# Patient Record
Sex: FEMALE | Race: WHITE | NOT HISPANIC OR LATINO | Employment: OTHER | URBAN - METROPOLITAN AREA
[De-identification: names, ages, dates, MRNs, and addresses within clinical notes are randomized per-mention and may not be internally consistent; named-entity substitution may affect disease eponyms.]

---

## 2023-11-15 ENCOUNTER — APPOINTMENT (OUTPATIENT)
Dept: PRIMARY CARE | Facility: CLINIC | Age: 73
End: 2023-11-15
Payer: COMMERCIAL

## 2023-11-26 PROBLEM — T21.21XA SECOND DEGREE BURN OF CHEST WALL: Status: ACTIVE | Noted: 2023-11-26

## 2023-11-26 PROBLEM — S92.309A METATARSAL FRACTURE: Status: ACTIVE | Noted: 2023-11-26

## 2023-11-26 PROBLEM — R07.89 INTERMITTENT LEFT-SIDED CHEST PAIN: Status: ACTIVE | Noted: 2023-11-26

## 2023-11-26 PROBLEM — B36.9 FUNGAL RASH OF TRUNK: Status: ACTIVE | Noted: 2023-11-26

## 2023-11-26 PROBLEM — D64.9 ANEMIA: Status: ACTIVE | Noted: 2023-11-26

## 2023-11-26 PROBLEM — S06.5XAA SDH (SUBDURAL HEMATOMA) (MULTI): Status: ACTIVE | Noted: 2023-11-26

## 2023-11-26 PROBLEM — A49.02 METHICILLIN RESISTANT STAPHYLOCOCCUS AUREUS INFECTION: Status: ACTIVE | Noted: 2023-11-26

## 2023-11-26 PROBLEM — S01.00XA OPEN WOUND OF SCALP: Status: ACTIVE | Noted: 2023-11-26

## 2023-11-26 PROBLEM — N18.2 CHRONIC KIDNEY DISEASE, STAGE 2 (MILD): Status: ACTIVE | Noted: 2023-11-26

## 2023-11-26 PROBLEM — K59.00 CONSTIPATION: Status: ACTIVE | Noted: 2023-11-26

## 2023-11-26 PROBLEM — G47.00 INSOMNIA: Status: ACTIVE | Noted: 2023-11-26

## 2023-11-26 PROBLEM — J45.909 ASTHMA (HHS-HCC): Status: ACTIVE | Noted: 2023-11-26

## 2023-11-26 PROBLEM — E83.52 HYPERCALCEMIA: Status: ACTIVE | Noted: 2023-11-26
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
## 2023-11-26 PROBLEM — R30.0 BURNING WITH URINATION: Status: ACTIVE | Noted: 2023-11-26

## 2023-11-26 PROBLEM — R39.11 URINARY HESITANCY: Status: ACTIVE | Noted: 2023-11-26

## 2023-11-26 PROBLEM — G06.2 SUBDURAL EMPYEMA (HHS-HCC): Status: ACTIVE | Noted: 2023-11-26

## 2023-11-26 PROBLEM — T81.30XA WOUND DEHISCENCE: Status: ACTIVE | Noted: 2023-11-26

## 2023-11-26 PROBLEM — G43.909 MIGRAINES: Status: ACTIVE | Noted: 2023-11-26

## 2023-11-26 PROBLEM — D75.1 ERYTHROCYTOSIS: Status: ACTIVE | Noted: 2023-11-26

## 2023-11-26 PROBLEM — N90.89 LESION OF VULVA: Status: ACTIVE | Noted: 2023-11-26

## 2023-11-26 PROBLEM — F17.210 CIGARETTE NICOTINE DEPENDENCE WITHOUT COMPLICATION: Status: ACTIVE | Noted: 2023-11-26

## 2023-11-26 PROBLEM — M81.0 OSTEOPOROSIS: Status: ACTIVE | Noted: 2023-11-26

## 2023-11-26 PROBLEM — M19.90 ARTHRITIS: Status: ACTIVE | Noted: 2023-11-26

## 2023-11-26 PROBLEM — M79.676 TOE PAIN: Status: ACTIVE | Noted: 2023-11-26

## 2023-11-26 PROBLEM — N89.8 VAGINAL IRRITATION: Status: ACTIVE | Noted: 2023-11-26
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
## 2023-11-26 PROBLEM — J06.9 VIRAL UPPER RESPIRATORY INFECTION: Status: ACTIVE | Noted: 2023-11-26

## 2023-11-26 PROBLEM — M54.2 NECK PAIN: Status: ACTIVE | Noted: 2023-11-26

## 2023-11-26 PROBLEM — E55.9 VITAMIN D DEFICIENCY: Status: ACTIVE | Noted: 2023-11-26

## 2023-11-26 PROBLEM — E87.6 HYPOKALEMIA: Status: ACTIVE | Noted: 2023-11-26

## 2023-11-26 PROBLEM — R50.9 FEVER: Status: ACTIVE | Noted: 2023-11-26

## 2023-11-26 PROBLEM — B37.31 VAGINAL YEAST INFECTION: Status: ACTIVE | Noted: 2023-11-26

## 2023-11-26 PROBLEM — S92.355A CLOSED NONDISPLACED FRACTURE OF FIFTH LEFT METATARSAL BONE: Status: ACTIVE | Noted: 2023-11-26

## 2023-11-26 PROBLEM — J18.9 PNEUMONIA: Status: ACTIVE | Noted: 2023-11-26

## 2023-11-26 PROBLEM — E78.00 HYPERCHOLESTEROLEMIA: Status: ACTIVE | Noted: 2023-11-26

## 2023-11-26 PROBLEM — I10 BENIGN ESSENTIAL HYPERTENSION: Status: ACTIVE | Noted: 2023-11-26

## 2023-11-26 RX ORDER — ALCLOMETASONE DIPROPIONATE 0.5 MG/G
OINTMENT TOPICAL 2 TIMES DAILY
COMMUNITY
Start: 2017-10-13

## 2023-11-26 RX ORDER — GABAPENTIN 400 MG/1
1 CAPSULE ORAL 4 TIMES DAILY
COMMUNITY
Start: 2018-01-12

## 2023-11-26 RX ORDER — DOXYCYCLINE 100 MG/1
100 CAPSULE ORAL EVERY 12 HOURS
COMMUNITY
Start: 2021-04-23 | End: 2024-01-08 | Stop reason: WASHOUT

## 2023-11-30 ENCOUNTER — TELEMEDICINE (OUTPATIENT)
Dept: INFECTIOUS DISEASES | Facility: CLINIC | Age: 73
End: 2023-11-30
Payer: COMMERCIAL

## 2023-11-30 DIAGNOSIS — M86.38 CHRONIC MULTIFOCAL OSTEOMYELITIS, OTHER SITE (MULTI): Primary | ICD-10-CM

## 2023-11-30 DIAGNOSIS — A49.02 METHICILLIN RESISTANT STAPHYLOCOCCUS AUREUS INFECTION: ICD-10-CM

## 2023-11-30 PROCEDURE — 99214 OFFICE O/P EST MOD 30 MIN: CPT | Performed by: INTERNAL MEDICINE

## 2023-11-30 RX ORDER — SULFAMETHOXAZOLE AND TRIMETHOPRIM 800; 160 MG/1; MG/1
1 TABLET ORAL DAILY
Qty: 90 TABLET | Refills: 1 | Status: SHIPPED | OUTPATIENT
Start: 2023-11-30 | End: 2023-11-30 | Stop reason: ENTERED-IN-ERROR

## 2023-11-30 ASSESSMENT — ENCOUNTER SYMPTOMS
ALLERGIC/IMMUNOLOGIC NEGATIVE: 1
ENDOCRINE NEGATIVE: 1
MUSCULOSKELETAL NEGATIVE: 1
CARDIOVASCULAR NEGATIVE: 1
NEUROLOGICAL NEGATIVE: 1
GASTROINTESTINAL NEGATIVE: 1
RESPIRATORY NEGATIVE: 1
HEMATOLOGIC/LYMPHATIC NEGATIVE: 1
CONSTITUTIONAL NEGATIVE: 1
EYES NEGATIVE: 1
PSYCHIATRIC NEGATIVE: 1

## 2023-11-30 NOTE — PROGRESS NOTES
Infectious Diseases Clinic Follow-up:    Reason for Visit: No chief complaint on file.      History of Current Issue  Emy Potts is a 73 y.o. year old female       Here for routine follow up. Known to me from 2019 for cranial infection with hardware. Recently admitted in 9/2021 for suspicion of surgical site infection due to exposed wire, full details as below.     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     # cranial hardware infection  Unclear if infection lead to wound dehiscence or vice versa.  Hardware growing coag negative staph (methicillin sensitive) and therefore  concern for surrounding associated osteomyelitis and will need long course of  antibiotics. In addition new hardware placed.  Continue        Recommendations:  - Start cefazolin 2 grams IV every 8 hours; plan on stop date of 11/05/202  - Restart Doxycycline 100 mg PO twice a day for chronic suppression (hx of MRSA  infection, not expected to be covered by cefazolin)  - stop cefepime, vancomycin .  - While on antibiotics, collect the following labs weekly: CBC with  differential, BMP. Fax all results to [phone] (att Dr Maciel)  - Please include in discharge information the need to monitor patient for  fevers, chills, excessive night sweats, rash, vomiting, diarrhea, worsening  pain and other signs of active infection or antibiotic side effects.  - Patient has a follow up appointment with Dr Jesus Manuel Maciel in Infectious  Diseases clinics on 10/28/2021 at 10 AM. Call [phone] for questions after  discharge.     Discussed with primary team.     ID will sign off.  Please call us with questions.  ID team B pager [phone].  For new consults, contact pager [phone]     PAST MEDICAL  HISTORY:  Past Medical History:   Diagnosis Date    Personal history of other diseases of the circulatory system 01/14/2014    History of hypertension    Personal history of other diseases of the circulatory system 07/01/2020    History of subdural hematoma    Personal history of other diseases of the musculoskeletal system and connective tissue 06/26/2018    History of low back pain    Personal history of other infectious and parasitic diseases 07/01/2020    History of staphylococcal infection    Personal history of other specified conditions 06/26/2018    History of insomnia    Personal history of pneumonia (recurrent) 07/17/2018    History of pneumonia    Unspecified asthma, uncomplicated 03/19/2017    Asthma       PAST SURGICAL HISTORY:  Past Surgical History:   Procedure Laterality Date    BACK SURGERY  01/14/2014    Back Surgery    CT HEAD ANGIO W AND WO IV CONTRAST  8/18/2019    CT HEAD ANGIO W AND WO IV CONTRAST 8/18/2019 [facility]GAC[facility]    MR HEAD ANGIO WO IV CONTRAST  8/2/2019    MR HEAD ANGIO WO IV CONTRAST 8/2/2019 [facility]ICAL [facility]    MR NECK ANGIO WO IV CONTRAST  8/2/2019    MR NECK ANGIO WO IV CONTRAST 8/2/2019 [facility]ICAL [facility]    NECK SURGERY  01/14/2014    Neck Surgery    OTHER SURGICAL HISTORY  01/14/2014    General Surgery    OTHER SURGICAL HISTORY  07/01/2020    Brain surgery    OTHER SURGICAL HISTORY  07/15/2022    Ectopic pregnancy removal with salpingectomy       ALLERGIES:    No Known Allergies    MEDICATIONS:      Current Outpatient Medications:     alclometasone (Aclovate) 0.05 % ointment, Apply topically 2 times a day., Disp: , Rfl:     doxycycline (Vibramycin) 100 mg capsule, 1 capsule (100 mg) every 12 hours., Disp: , Rfl:     gabapentin (Neurontin) 400 mg capsule, Take 1 capsule (400 mg) by mouth 4 times a day., Disp: , Rfl:     REVIEW OF SYSTEMS:  Review of Systems   Constitutional: Negative.    HENT: Negative.     Eyes: Negative.    Respiratory: Negative.      Cardiovascular: Negative.    Gastrointestinal: Negative.    Endocrine: Negative.    Genitourinary: Negative.    Musculoskeletal: Negative.    Skin: Negative.    Allergic/Immunologic: Negative.    Neurological: Negative.    Hematological: Negative.    Psychiatric/Behavioral: Negative.       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     IMPRESSIONS:  # Cranial hardware infection  Unclear if infection lead to wound dehiscence or vice versa.  Hardware growing coag negative staph (methicillin sensitive) and therefore  concern for surrounding associated osteomyelitis and will need long course of  antibiotics. In addition new hardware placed.  Continue     CURRENT:  Doing well.  No issues with doxy     PLAN:  Doxycycline 100 mg PO q12 for chronic suppression  RTC in 4-6 mo     Pt is in agreement with plan. States that all their questions were answered.       I spent 20 minutes in the professional and overall care of this patient.      Jesus Manuel Maciel MD MPH

## 2023-12-06 ENCOUNTER — APPOINTMENT (OUTPATIENT)
Dept: PRIMARY CARE | Facility: CLINIC | Age: 73
End: 2023-12-06
Payer: COMMERCIAL

## 2024-01-08 DIAGNOSIS — I10 BENIGN ESSENTIAL HYPERTENSION: Primary | ICD-10-CM

## 2024-01-08 PROBLEM — J06.9 VIRAL UPPER RESPIRATORY INFECTION: Status: RESOLVED | Noted: 2023-11-26 | Resolved: 2024-01-08

## 2024-01-08 PROBLEM — M75.102 ROTATOR CUFF TEAR ARTHROPATHY, LEFT: Status: ACTIVE | Noted: 2020-08-07

## 2024-01-08 PROBLEM — G89.11 ACUTE PAIN DUE TO TRAUMA: Status: RESOLVED | Noted: 2024-01-08 | Resolved: 2024-01-08

## 2024-01-08 PROBLEM — M54.2 NECK PAIN: Status: RESOLVED | Noted: 2023-11-26 | Resolved: 2024-01-08

## 2024-01-08 PROBLEM — M25.512 PAIN IN LEFT SHOULDER: Status: RESOLVED | Noted: 2020-08-20 | Resolved: 2024-01-08

## 2024-01-08 PROBLEM — B36.9 FUNGAL RASH OF TRUNK: Status: RESOLVED | Noted: 2023-11-26 | Resolved: 2024-01-08

## 2024-01-08 PROBLEM — R30.0 BURNING WITH URINATION: Status: RESOLVED | Noted: 2023-11-26 | Resolved: 2024-01-08

## 2024-01-08 PROBLEM — K21.9 GERD (GASTROESOPHAGEAL REFLUX DISEASE): Status: ACTIVE | Noted: 2020-08-07

## 2024-01-08 PROBLEM — M19.012 PRIMARY OSTEOARTHRITIS OF LEFT SHOULDER: Status: ACTIVE | Noted: 2020-08-07
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
## 2024-01-08 PROBLEM — J18.9 PNEUMONIA: Status: RESOLVED | Noted: 2023-11-26 | Resolved: 2024-01-08

## 2024-01-08 PROBLEM — R07.89 INTERMITTENT LEFT-SIDED CHEST PAIN: Status: RESOLVED | Noted: 2023-11-26 | Resolved: 2024-01-08

## 2024-01-08 PROBLEM — S46.102A INJURY OF TENDON OF LONG HEAD OF BICEPS, LEFT, INITIAL ENCOUNTER: Status: RESOLVED | Noted: 2020-08-07 | Resolved: 2024-01-08

## 2024-01-08 PROBLEM — M79.676 TOE PAIN: Status: RESOLVED | Noted: 2023-11-26 | Resolved: 2024-01-08

## 2024-01-08 PROBLEM — Z86.79 HISTORY OF SUBDURAL HEMATOMA: Status: RESOLVED | Noted: 2020-08-07 | Resolved: 2024-01-08

## 2024-01-08 PROBLEM — N90.89 LESION OF VULVA: Status: RESOLVED | Noted: 2023-11-26 | Resolved: 2024-01-08

## 2024-01-08 PROBLEM — R50.9 FEVER: Status: RESOLVED | Noted: 2023-11-26 | Resolved: 2024-01-08

## 2024-01-08 PROBLEM — T81.30XA WOUND DEHISCENCE: Status: RESOLVED | Noted: 2023-11-26 | Resolved: 2024-01-08

## 2024-01-08 PROBLEM — M25.572 ACUTE LEFT ANKLE PAIN: Status: RESOLVED | Noted: 2024-01-08 | Resolved: 2024-01-08
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
## 2024-01-08 PROBLEM — R39.11 URINARY HESITANCY: Status: RESOLVED | Noted: 2023-11-26 | Resolved: 2024-01-08

## 2024-01-08 PROBLEM — N89.8 VAGINAL IRRITATION: Status: RESOLVED | Noted: 2023-11-26 | Resolved: 2024-01-08

## 2024-01-08 PROBLEM — B37.31 VAGINAL YEAST INFECTION: Status: RESOLVED | Noted: 2023-11-26 | Resolved: 2024-01-08

## 2024-01-08 PROBLEM — S92.355A CLOSED NONDISPLACED FRACTURE OF FIFTH LEFT METATARSAL BONE: Status: RESOLVED | Noted: 2023-11-26 | Resolved: 2024-01-08

## 2024-01-08 PROBLEM — T21.21XA SECOND DEGREE BURN OF CHEST WALL: Status: RESOLVED | Noted: 2023-11-26 | Resolved: 2024-01-08

## 2024-01-08 PROBLEM — S92.309A METATARSAL FRACTURE: Status: RESOLVED | Noted: 2023-11-26 | Resolved: 2024-01-08

## 2024-01-08 PROBLEM — S01.00XA OPEN WOUND OF SCALP: Status: RESOLVED | Noted: 2023-11-26 | Resolved: 2024-01-08

## 2024-01-08 PROBLEM — K59.00 CONSTIPATION: Status: RESOLVED | Noted: 2023-11-26 | Resolved: 2024-01-08

## 2024-01-08 PROBLEM — M24.512 CONTRACTURE OF LEFT SHOULDER: Status: RESOLVED | Noted: 2020-08-07 | Resolved: 2024-01-08

## 2024-01-08 PROBLEM — M12.812 ROTATOR CUFF TEAR ARTHROPATHY, LEFT: Status: ACTIVE | Noted: 2020-08-07

## 2024-01-08 RX ORDER — ALBUTEROL SULFATE 90 UG/1
2 AEROSOL, METERED RESPIRATORY (INHALATION) EVERY 4 HOURS PRN
COMMUNITY
Start: 2013-09-19 | End: 2024-02-05 | Stop reason: SDUPTHER

## 2024-01-08 RX ORDER — OLMESARTAN MEDOXOMIL 20 MG/1
1 TABLET ORAL DAILY
COMMUNITY
Start: 2020-08-05 | End: 2024-01-08 | Stop reason: SDUPTHER

## 2024-01-08 RX ORDER — NYSTATIN 100000 U/G
CREAM TOPICAL AS NEEDED
COMMUNITY
Start: 2019-10-17

## 2024-01-08 RX ORDER — CHOLECALCIFEROL (VITAMIN D3) 25 MCG
1 TABLET ORAL DAILY
COMMUNITY

## 2024-01-08 RX ORDER — TIZANIDINE 2 MG/1
TABLET ORAL 4 TIMES DAILY PRN
COMMUNITY

## 2024-01-08 RX ORDER — HYDROCHLOROTHIAZIDE 25 MG/1
25 TABLET ORAL
COMMUNITY
Start: 2020-07-01 | End: 2024-02-05 | Stop reason: WASHOUT

## 2024-01-08 RX ORDER — TRAMADOL HYDROCHLORIDE 50 MG/1
1 TABLET ORAL EVERY 4 HOURS
COMMUNITY
Start: 2014-01-14

## 2024-01-08 RX ORDER — OLMESARTAN MEDOXOMIL 20 MG/1
20 TABLET ORAL DAILY
Qty: 90 TABLET | Refills: 1 | Status: SHIPPED | OUTPATIENT
Start: 2024-01-08 | End: 2024-02-05 | Stop reason: SDUPTHER

## 2024-02-01 ENCOUNTER — LAB (OUTPATIENT)
Dept: LAB | Facility: LAB | Age: 74
End: 2024-02-01
Payer: COMMERCIAL

## 2024-02-01 DIAGNOSIS — Z00.00 ENCOUNTER FOR GENERAL ADULT MEDICAL EXAMINATION WITHOUT ABNORMAL FINDINGS: Primary | ICD-10-CM

## 2024-02-01 DIAGNOSIS — D64.9 ANEMIA, UNSPECIFIED: ICD-10-CM

## 2024-02-01 DIAGNOSIS — I10 ESSENTIAL (PRIMARY) HYPERTENSION: ICD-10-CM

## 2024-02-01 DIAGNOSIS — E78.00 PURE HYPERCHOLESTEROLEMIA, UNSPECIFIED: ICD-10-CM

## 2024-02-01 DIAGNOSIS — M81.0 AGE-RELATED OSTEOPOROSIS WITHOUT CURRENT PATHOLOGICAL FRACTURE: ICD-10-CM

## 2024-02-01 DIAGNOSIS — E83.52 HYPERCALCEMIA: ICD-10-CM

## 2024-02-01 LAB
25(OH)D3 SERPL-MCNC: 65 NG/ML (ref 30–100)
ALBUMIN SERPL BCP-MCNC: 4.2 G/DL (ref 3.4–5)
ALP SERPL-CCNC: 37 U/L (ref 33–136)
ALT SERPL W P-5'-P-CCNC: 14 U/L (ref 7–45)
ANION GAP SERPL CALC-SCNC: 15 MMOL/L (ref 10–20)
AST SERPL W P-5'-P-CCNC: 15 U/L (ref 9–39)
BASOPHILS # BLD AUTO: 0.08 X10*3/UL (ref 0–0.1)
BASOPHILS NFR BLD AUTO: 1.1 %
BILIRUB SERPL-MCNC: 0.6 MG/DL (ref 0–1.2)
BUN SERPL-MCNC: 33 MG/DL (ref 6–23)
CALCIUM SERPL-MCNC: 11.2 MG/DL (ref 8.6–10.6)
CHLORIDE SERPL-SCNC: 98 MMOL/L (ref 98–107)
CHOLEST SERPL-MCNC: 276 MG/DL (ref 0–199)
CHOLESTEROL/HDL RATIO: 3.6
CO2 SERPL-SCNC: 33 MMOL/L (ref 21–32)
CREAT SERPL-MCNC: 0.75 MG/DL (ref 0.5–1.05)
EGFRCR SERPLBLD CKD-EPI 2021: 85 ML/MIN/1.73M*2
EOSINOPHIL # BLD AUTO: 0.06 X10*3/UL (ref 0–0.4)
EOSINOPHIL NFR BLD AUTO: 0.8 %
ERYTHROCYTE [DISTWIDTH] IN BLOOD BY AUTOMATED COUNT: 14.3 % (ref 11.5–14.5)
GLUCOSE SERPL-MCNC: 93 MG/DL (ref 74–99)
HCT VFR BLD AUTO: 40.4 % (ref 36–46)
HDLC SERPL-MCNC: 76.9 MG/DL
HGB BLD-MCNC: 13.4 G/DL (ref 12–16)
IMM GRANULOCYTES # BLD AUTO: 0.07 X10*3/UL (ref 0–0.5)
IMM GRANULOCYTES NFR BLD AUTO: 1 % (ref 0–0.9)
LDLC SERPL CALC-MCNC: 183 MG/DL
LYMPHOCYTES # BLD AUTO: 1.88 X10*3/UL (ref 0.8–3)
LYMPHOCYTES NFR BLD AUTO: 25.5 %
MCH RBC QN AUTO: 30.3 PG (ref 26–34)
MCHC RBC AUTO-ENTMCNC: 33.2 G/DL (ref 32–36)
MCV RBC AUTO: 91 FL (ref 80–100)
MONOCYTES # BLD AUTO: 0.63 X10*3/UL (ref 0.05–0.8)
MONOCYTES NFR BLD AUTO: 8.6 %
NEUTROPHILS # BLD AUTO: 4.64 X10*3/UL (ref 1.6–5.5)
NEUTROPHILS NFR BLD AUTO: 63 %
NON HDL CHOLESTEROL: 199 MG/DL (ref 0–149)
NRBC BLD-RTO: 0 /100 WBCS (ref 0–0)
PLATELET # BLD AUTO: 331 X10*3/UL (ref 150–450)
POTASSIUM SERPL-SCNC: 5.3 MMOL/L (ref 3.5–5.3)
PROT SERPL-MCNC: 6.6 G/DL (ref 6.4–8.2)
PTH-INTACT SERPL-MCNC: 57.5 PG/ML (ref 18.5–88)
RBC # BLD AUTO: 4.42 X10*6/UL (ref 4–5.2)
SODIUM SERPL-SCNC: 141 MMOL/L (ref 136–145)
TRIGL SERPL-MCNC: 79 MG/DL (ref 0–149)
TSH SERPL-ACNC: 3.15 MIU/L (ref 0.44–3.98)
VLDL: 16 MG/DL (ref 0–40)
WBC # BLD AUTO: 7.4 X10*3/UL (ref 4.4–11.3)

## 2024-02-01 PROCEDURE — 80053 COMPREHEN METABOLIC PANEL: CPT

## 2024-02-01 PROCEDURE — 80061 LIPID PANEL: CPT

## 2024-02-01 PROCEDURE — 83970 ASSAY OF PARATHORMONE: CPT

## 2024-02-01 PROCEDURE — 36415 COLL VENOUS BLD VENIPUNCTURE: CPT

## 2024-02-01 PROCEDURE — 85025 COMPLETE CBC W/AUTO DIFF WBC: CPT

## 2024-02-01 PROCEDURE — 84443 ASSAY THYROID STIM HORMONE: CPT

## 2024-02-01 PROCEDURE — 82306 VITAMIN D 25 HYDROXY: CPT

## 2024-02-05 ENCOUNTER — OFFICE VISIT (OUTPATIENT)
Dept: PRIMARY CARE | Facility: CLINIC | Age: 74
End: 2024-02-05
Payer: COMMERCIAL

## 2024-02-05 VITALS
WEIGHT: 153.22 LBS | BODY MASS INDEX: 27.15 KG/M2 | HEIGHT: 63 IN | TEMPERATURE: 97.1 F | DIASTOLIC BLOOD PRESSURE: 72 MMHG | SYSTOLIC BLOOD PRESSURE: 129 MMHG | OXYGEN SATURATION: 98 % | HEART RATE: 110 BPM

## 2024-02-05 DIAGNOSIS — Z00.00 MEDICARE ANNUAL WELLNESS VISIT, SUBSEQUENT: Primary | ICD-10-CM

## 2024-02-05 DIAGNOSIS — Z12.31 ENCOUNTER FOR SCREENING MAMMOGRAM FOR BREAST CANCER: ICD-10-CM

## 2024-02-05 DIAGNOSIS — J45.909 ASTHMA, UNSPECIFIED ASTHMA SEVERITY, UNSPECIFIED WHETHER COMPLICATED, UNSPECIFIED WHETHER PERSISTENT (HHS-HCC): ICD-10-CM

## 2024-02-05 DIAGNOSIS — Z78.0 POSTMENOPAUSAL: ICD-10-CM

## 2024-02-05 DIAGNOSIS — I10 BENIGN ESSENTIAL HYPERTENSION: ICD-10-CM

## 2024-02-05 DIAGNOSIS — E78.00 HYPERCHOLESTEROLEMIA: ICD-10-CM

## 2024-02-05 PROBLEM — F17.210 CIGARETTE NICOTINE DEPENDENCE WITHOUT COMPLICATION: Status: RESOLVED | Noted: 2023-11-26 | Resolved: 2024-02-05

## 2024-02-05 PROCEDURE — 1160F RVW MEDS BY RX/DR IN RCRD: CPT | Performed by: INTERNAL MEDICINE

## 2024-02-05 PROCEDURE — 99213 OFFICE O/P EST LOW 20 MIN: CPT | Performed by: INTERNAL MEDICINE

## 2024-02-05 PROCEDURE — 3074F SYST BP LT 130 MM HG: CPT | Performed by: INTERNAL MEDICINE

## 2024-02-05 PROCEDURE — 1170F FXNL STATUS ASSESSED: CPT | Performed by: INTERNAL MEDICINE

## 2024-02-05 PROCEDURE — 1126F AMNT PAIN NOTED NONE PRSNT: CPT | Performed by: INTERNAL MEDICINE

## 2024-02-05 PROCEDURE — 1159F MED LIST DOCD IN RCRD: CPT | Performed by: INTERNAL MEDICINE

## 2024-02-05 PROCEDURE — 3078F DIAST BP <80 MM HG: CPT | Performed by: INTERNAL MEDICINE

## 2024-02-05 PROCEDURE — G0439 PPPS, SUBSEQ VISIT: HCPCS | Performed by: INTERNAL MEDICINE

## 2024-02-05 RX ORDER — ATORVASTATIN CALCIUM 20 MG/1
20 TABLET, FILM COATED ORAL DAILY
Qty: 90 TABLET | Refills: 1 | Status: SHIPPED | OUTPATIENT
Start: 2024-02-05 | End: 2024-08-03

## 2024-02-05 RX ORDER — ALBUTEROL SULFATE 90 UG/1
2 AEROSOL, METERED RESPIRATORY (INHALATION) EVERY 4 HOURS PRN
Qty: 18 G | Refills: 2 | Status: SHIPPED | OUTPATIENT
Start: 2024-02-05

## 2024-02-05 RX ORDER — OLMESARTAN MEDOXOMIL 20 MG/1
20 TABLET ORAL DAILY
Qty: 90 TABLET | Refills: 1 | Status: SHIPPED | OUTPATIENT
Start: 2024-02-05 | End: 2024-08-03

## 2024-02-05 ASSESSMENT — PATIENT HEALTH QUESTIONNAIRE - PHQ9
SUM OF ALL RESPONSES TO PHQ9 QUESTIONS 1 AND 2: 0
1. LITTLE INTEREST OR PLEASURE IN DOING THINGS: NOT AT ALL
2. FEELING DOWN, DEPRESSED OR HOPELESS: NOT AT ALL

## 2024-02-05 ASSESSMENT — ENCOUNTER SYMPTOMS
OCCASIONAL FEELINGS OF UNSTEADINESS: 0
BACK PAIN: 1
LOSS OF SENSATION IN FEET: 0

## 2024-02-05 ASSESSMENT — PAIN SCALES - GENERAL: PAINLEVEL: 0-NO PAIN

## 2024-02-05 ASSESSMENT — ACTIVITIES OF DAILY LIVING (ADL)
DRESSING: INDEPENDENT
DOING_HOUSEWORK: INDEPENDENT
GROCERY_SHOPPING: NEEDS ASSISTANCE
BATHING: INDEPENDENT
TAKING_MEDICATION: INDEPENDENT
MANAGING_FINANCES: INDEPENDENT

## 2024-02-05 NOTE — PATIENT INSTRUCTIONS
Have coronary calcium score.  Start taking atorvastatin 20 mg daily, follow low-cholesterol diet.  Repeat CMP and fasting lipids in 3 months.  Have Shingrix vaccine at the local pharmacy.  Have screening mammogram and bone density scan.  Follow-up in 6 months.

## 2024-02-05 NOTE — ASSESSMENT & PLAN NOTE
Hypertension : controlled blood pressure and continues same medications and educate a low salt diet do not exceed 200 mg per serving. Keep monitor the blood pressure at home. Refill medications.

## 2024-02-05 NOTE — ASSESSMENT & PLAN NOTE
Hypercholesterolemia: reviewed lipid profile.   Educate low cholesterol diet , avoid fast foods , processed meats and fried foods, red meat.  Increase physical activity.  Keep a low carb diet.  Start Atorvastatin 20mg. Daily.  Check CMP, fasting lipids in 3 mo.

## 2024-02-05 NOTE — ASSESSMENT & PLAN NOTE
No recent hospitalizations.    All medications reviewed and reconciled by me the provider..  No use of controlled substances or opiates.    Family history, social history reviewed, no changes.    Patient does not smoke.    Patient does not drink.    Patient hydrates adequately daily.  Eats a well-balanced healthy diet.     Exercises adequately including walking and doing weightbearing exercises.    Patient denies any difficulty with memory or cognition.     Denies any difficulty with hearing.  Patient does not wear hearing aids.    No fall risk.  No recent falls.  Denies any difficulty walking.    Patient with no history of depression anxiety, denies any loss of interest, no feeling of sadness, no lack of motivation.    Patient is independent in all ADLs and IADLs.  Independent bathing, dressing, walking.  Takes care of own finances, shopping and cooking.     End-of-life decision-making power of  reviewed with patient.  Rec. Shingrix vaccine.  Have bone density scan and screening mammogram.

## 2024-02-05 NOTE — PROGRESS NOTES
"Subjective   Patient ID: Emy Potts is a 73 y.o. female who presents for Medicare Annual Wellness Visit Subsequent.    Subsequent Medicare wellness visit.  She is in the room with her .  She has hypertension hypercholesterolemia, history of subdural hematoma ,MRSA skull infection, COPD, former smoker quit in 2015.  She follows up with pain management specialist for chronic back pain has had spinal surgeries.  No recent events no hospitalizations no falls.         Review of Systems   Musculoskeletal:  Positive for back pain.   All other systems reviewed and are negative.      Objective   /72 (BP Location: Left arm, Patient Position: Sitting)   Pulse (!) 132   Temp 36.2 °C (97.1 °F) (Temporal)   Ht 1.61 m (5' 3.39\")   Wt 69.5 kg (153 lb 3.5 oz)   SpO2 98%   BMI 26.81 kg/m²     Physical Exam  Constitutional:       Appearance: Normal appearance.   HENT:      Head: Normocephalic and atraumatic.      Right Ear: External ear normal.      Left Ear: External ear normal.      Mouth/Throat:      Mouth: Mucous membranes are moist.      Pharynx: Oropharynx is clear.   Neck:      Vascular: No carotid bruit.   Cardiovascular:      Rate and Rhythm: Regular rhythm. Tachycardia present.      Heart sounds: No murmur heard.     No gallop.   Pulmonary:      Effort: Pulmonary effort is normal. No respiratory distress.      Breath sounds: No wheezing or rales.   Abdominal:      General: Abdomen is flat.      Palpations: Abdomen is soft.      Hernia: No hernia is present.   Musculoskeletal:         General: Deformity present. No swelling, tenderness or signs of injury.      Cervical back: No rigidity or tenderness.      Right lower leg: No edema.      Comments: Spine deformity   Lymphadenopathy:      Cervical: No cervical adenopathy.   Skin:     Coloration: Skin is not jaundiced or pale.      Findings: Erythema present. No bruising, lesion or rash.      Comments: Hands erythema   Neurological:      General: No focal " deficit present.      Mental Status: She is oriented to person, place, and time.      Motor: No weakness.      Coordination: Coordination normal.   Psychiatric:         Mood and Affect: Mood normal.         Behavior: Behavior normal.         Assessment/Plan   Problem List Items Addressed This Visit             ICD-10-CM    Asthma J45.909    Relevant Medications    albuterol 90 mcg/actuation inhaler    Benign essential hypertension I10     Hypertension : controlled blood pressure and continues same medications and educate a low salt diet do not exceed 200 mg per serving. Keep monitor the blood pressure at home. Refill medications.           Relevant Medications    olmesartan (BENIcar) 20 mg tablet    Other Relevant Orders    Comprehensive Metabolic Panel    Hypercholesterolemia E78.00     Hypercholesterolemia: reviewed lipid profile.   Educate low cholesterol diet , avoid fast foods , processed meats and fried foods, red meat.  Increase physical activity.  Keep a low carb diet.  Start Atorvastatin 20mg. Daily.  Check CMP, fasting lipids in 3 mo.           Relevant Medications    atorvastatin (Lipitor) 20 mg tablet    Other Relevant Orders    Lipid Panel    CT cardiac scoring wo IV contrast    Medicare annual wellness visit, subsequent - Primary Z00.00     No recent hospitalizations.    All medications reviewed and reconciled by me the provider..  No use of controlled substances or opiates.    Family history, social history reviewed, no changes.    Patient does not smoke.    Patient does not drink.    Patient hydrates adequately daily.  Eats a well-balanced healthy diet.     Exercises adequately including walking and doing weightbearing exercises.    Patient denies any difficulty with memory or cognition.     Denies any difficulty with hearing.  Patient does not wear hearing aids.    No fall risk.  No recent falls.  Denies any difficulty walking.    Patient with no history of depression anxiety, denies any loss of  interest, no feeling of sadness, no lack of motivation.    Patient is independent in all ADLs and IADLs.  Independent bathing, dressing, walking.  Takes care of own finances, shopping and cooking.     End-of-life decision-making power of  reviewed with patient.  Rec. Shingrix vaccine.  Have bone density scan and screening mammogram.          Other Visit Diagnoses         Codes    Encounter for screening mammogram for breast cancer     Z12.31    Relevant Orders    BI mammo bilateral screening tomosynthesis    Postmenopausal     Z78.0    Relevant Orders    XR DEXA bone density

## 2024-02-28 ENCOUNTER — HOSPITAL ENCOUNTER (OUTPATIENT)
Dept: RADIOLOGY | Facility: CLINIC | Age: 74
Discharge: HOME | End: 2024-02-28
Payer: COMMERCIAL

## 2024-02-28 VITALS — WEIGHT: 153.22 LBS | HEIGHT: 63 IN | BODY MASS INDEX: 27.15 KG/M2

## 2024-02-28 DIAGNOSIS — Z12.31 ENCOUNTER FOR SCREENING MAMMOGRAM FOR BREAST CANCER: ICD-10-CM

## 2024-02-28 PROCEDURE — 77067 SCR MAMMO BI INCL CAD: CPT

## 2024-02-28 PROCEDURE — 77063 BREAST TOMOSYNTHESIS BI: CPT | Performed by: STUDENT IN AN ORGANIZED HEALTH CARE EDUCATION/TRAINING PROGRAM

## 2024-02-28 PROCEDURE — 77067 SCR MAMMO BI INCL CAD: CPT | Performed by: STUDENT IN AN ORGANIZED HEALTH CARE EDUCATION/TRAINING PROGRAM

## 2024-05-11 DIAGNOSIS — M86.38 CHRONIC MULTIFOCAL OSTEOMYELITIS, OTHER SITE (MULTI): Primary | ICD-10-CM

## 2024-05-13 RX ORDER — DOXYCYCLINE 100 MG/1
100 CAPSULE ORAL EVERY 12 HOURS
Qty: 180 CAPSULE | Refills: 3 | Status: SHIPPED | OUTPATIENT
Start: 2024-05-13

## 2024-05-24 ENCOUNTER — HOSPITAL ENCOUNTER (OUTPATIENT)
Dept: RADIOLOGY | Facility: CLINIC | Age: 74
Discharge: HOME | End: 2024-05-24
Payer: COMMERCIAL

## 2024-05-24 DIAGNOSIS — Z78.0 POSTMENOPAUSAL: ICD-10-CM

## 2024-05-24 PROCEDURE — 77080 DXA BONE DENSITY AXIAL: CPT

## 2024-05-24 PROCEDURE — 77080 DXA BONE DENSITY AXIAL: CPT | Performed by: RADIOLOGY

## 2024-05-28 ENCOUNTER — TELEPHONE (OUTPATIENT)
Dept: PRIMARY CARE | Facility: CLINIC | Age: 74
End: 2024-05-28
Payer: COMMERCIAL

## 2024-05-28 DIAGNOSIS — R05.9 COUGH, UNSPECIFIED TYPE: Primary | ICD-10-CM

## 2024-05-28 DIAGNOSIS — J40 BRONCHITIS: Primary | ICD-10-CM

## 2024-05-28 DIAGNOSIS — R05.3 CHRONIC COUGH: Primary | ICD-10-CM

## 2024-05-28 RX ORDER — AZITHROMYCIN 250 MG/1
TABLET, FILM COATED ORAL DAILY
Qty: 6 TABLET | Refills: 0 | Status: SHIPPED | OUTPATIENT
Start: 2024-05-28 | End: 2024-06-02

## 2024-05-28 RX ORDER — AZITHROMYCIN 250 MG/1
TABLET, FILM COATED ORAL DAILY
Qty: 6 TABLET | Refills: 0 | Status: CANCELLED | OUTPATIENT
Start: 2024-05-28 | End: 2024-06-02

## 2024-05-31 DIAGNOSIS — M81.0 OSTEOPOROSIS, UNSPECIFIED OSTEOPOROSIS TYPE, UNSPECIFIED PATHOLOGICAL FRACTURE PRESENCE: ICD-10-CM

## 2024-06-02 RX ORDER — ALENDRONATE SODIUM 70 MG/1
70 TABLET ORAL
Qty: 12 TABLET | Refills: 1 | Status: SHIPPED | OUTPATIENT
Start: 2024-06-02 | End: 2024-11-29

## 2024-06-06 ENCOUNTER — TELEMEDICINE (OUTPATIENT)
Dept: INFECTIOUS DISEASES | Facility: CLINIC | Age: 74
End: 2024-06-06
Payer: COMMERCIAL

## 2024-06-06 DIAGNOSIS — A49.02 METHICILLIN RESISTANT STAPHYLOCOCCUS AUREUS INFECTION: ICD-10-CM

## 2024-06-06 DIAGNOSIS — M86.38 CHRONIC MULTIFOCAL OSTEOMYELITIS, OTHER SITE (MULTI): Primary | ICD-10-CM

## 2024-06-06 PROCEDURE — 99214 OFFICE O/P EST MOD 30 MIN: CPT | Performed by: INTERNAL MEDICINE

## 2024-06-06 PROCEDURE — 1159F MED LIST DOCD IN RCRD: CPT | Performed by: INTERNAL MEDICINE

## 2024-06-06 ASSESSMENT — ENCOUNTER SYMPTOMS
ALLERGIC/IMMUNOLOGIC NEGATIVE: 1
ENDOCRINE NEGATIVE: 1
NEUROLOGICAL NEGATIVE: 1
MUSCULOSKELETAL NEGATIVE: 1
PSYCHIATRIC NEGATIVE: 1
CONSTITUTIONAL NEGATIVE: 1
CARDIOVASCULAR NEGATIVE: 1
RESPIRATORY NEGATIVE: 1
GASTROINTESTINAL NEGATIVE: 1
EYES NEGATIVE: 1
HEMATOLOGIC/LYMPHATIC NEGATIVE: 1

## 2024-06-06 NOTE — PROGRESS NOTES
Infectious Diseases Clinic Follow-up:    Reason for Visit: No chief complaint on file.      History of Current Issue  Emy Potts is a 73 y.o. year old female      This was a telephone follow up visit. Known to me from 2019 for cranial infection with hardware. Recently admitted in 9/2021 for suspicion of surgical site infection due to exposed wire, full details as below.     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     # cranial hardware infection  Unclear if infection lead to wound dehiscence or vice versa.  Hardware growing coag negative staph (methicillin sensitive) and therefore  concern for surrounding associated osteomyelitis and will need long course of  antibiotics. In addition new hardware placed.  Continue        Recommendations:  - Start cefazolin 2 grams IV every 8 hours; plan on stop date of 11/05/202  - Restart Doxycycline 100 mg PO twice a day for chronic suppression (hx of MRSA  infection, not expected to be covered by cefazolin)  - stop cefepime, vancomycin .  - While on antibiotics, collect the following labs weekly: CBC with  differential, BMP. Fax all results to [phone] (att Dr Maciel)  - Please include in discharge information the need to monitor patient for  fevers, chills, excessive night sweats, rash, vomiting, diarrhea, worsening  pain and other signs of active infection or antibiotic side effects.  - Patient has a follow up appointment with Dr Jesus Manuel Maciel in Infectious  Diseases clinics on 10/28/2021 at 10 AM. Call [phone] for questions after  discharge.     Discussed with primary team.     ID will sign off.  Please call us with questions.  ID team B pager [phone].  For new consults, contact pager [phone]     PAST  MEDICAL HISTORY:  Past Medical History:   Diagnosis Date    Acute left ankle pain 01/08/2024    Acute pain due to trauma 01/08/2024    Contracture of left shoulder 08/07/2020    History of subdural hematoma 08/07/2020    Last Assessment & Plan: Formatting of this note might be different from the original. Assessment: H/o subdural hematoma after falling out of bed s/p surgery with subsequent procedure for staph infection - on doxy for hx of staph infection Residual deficits include loss of sensation of right hand and proprioception issue of right hand    Injury of tendon of long head of biceps, left, initial encounter 08/07/2020    Pain in left shoulder 08/20/2020    Personal history of other diseases of the circulatory system 01/14/2014    History of hypertension    Personal history of other diseases of the circulatory system 07/01/2020    History of subdural hematoma    Personal history of other diseases of the musculoskeletal system and connective tissue 06/26/2018    History of low back pain    Personal history of other infectious and parasitic diseases 07/01/2020    History of staphylococcal infection    Personal history of other specified conditions 06/26/2018    History of insomnia    Personal history of pneumonia (recurrent) 07/17/2018    History of pneumonia    Unspecified asthma, uncomplicated ([facility]) 03/19/2017    Asthma       PAST SURGICAL HISTORY:  Past Surgical History:   Procedure Laterality Date    BACK SURGERY  01/14/2014    Back Surgery    CT HEAD ANGIO W AND WO IV CONTRAST  8/18/2019    CT HEAD ANGIO W AND WO IV CONTRAST 8/18/2019 [facility]GAC[facility]    MR HEAD ANGIO WO IV CONTRAST  8/2/2019    MR HEAD ANGIO WO IV CONTRAST 8/2/2019 [facility]    MR NECK ANGIO WO IV CONTRAST  8/2/2019    MR NECK ANGIO WO IV CONTRAST 8/2/2019 [facility]ICAL [facility]    NECK SURGERY  01/14/2014    Neck Surgery    OTHER SURGICAL HISTORY  01/14/2014    General Surgery    OTHER SURGICAL HISTORY  07/01/2020     Brain surgery    OTHER SURGICAL HISTORY  07/15/2022    Ectopic pregnancy removal with salpingectomy       ALLERGIES:    No Known Allergies    MEDICATIONS:      Current Outpatient Medications:     albuterol 90 mcg/actuation inhaler, Inhale 2 puffs every 4 hours if needed for shortness of breath or wheezing., Disp: 18 g, Rfl: 2    alendronate (Fosamax) 70 mg tablet, Take 1 tablet (70 mg) by mouth every 7 days. Take in the morning with a full glass of water, on an empty stomach, and do not take anything else by mouth or lie down for the next 30 min., Disp: 12 tablet, Rfl: 1    atorvastatin (Lipitor) 20 mg tablet, Take 1 tablet (20 mg) by mouth once daily., Disp: 90 tablet, Rfl: 1    doxycycline (Vibramycin) 100 mg capsule, TAKE ONE CAPSULE BY MOUTH EVERY 12 HOURS, Disp: 180 capsule, Rfl: 3    gabapentin (Neurontin) 400 mg capsule, Take 1 capsule (400 mg) by mouth 4 times a day., Disp: , Rfl:     nystatin (Mycostatin) cream, Apply topically if needed. Apply to affected areas 2-3 times daily, Disp: , Rfl:     olmesartan (BENIcar) 20 mg tablet, Take 1 tablet (20 mg) by mouth once daily., Disp: 90 tablet, Rfl: 1    tiZANidine (Zanaflex) 2 mg tablet, Take by mouth 4 times a day as needed., Disp: , Rfl:     traMADol (Ultram) 50 mg tablet, Take 1 tablet (50 mg) by mouth every 4 hours., Disp: , Rfl:     alclometasone (Aclovate) 0.05 % ointment, Apply topically 2 times a day., Disp: , Rfl:     cholecalciferol (Vitamin D-3) 25 MCG (1000 UT) tablet, Take 1 tablet (25 mcg) by mouth once daily., Disp: , Rfl:     REVIEW OF SYSTEMS:  Review of Systems   Constitutional: Negative.    HENT: Negative.     Eyes: Negative.    Respiratory: Negative.     Cardiovascular: Negative.    Gastrointestinal: Negative.    Endocrine: Negative.    Genitourinary: Negative.    Musculoskeletal: Negative.    Skin: Negative.    Allergic/Immunologic: Negative.    Neurological: Negative.    Hematological: Negative.    Psychiatric/Behavioral: Negative.          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     IMPRESSIONS:  # Cranial hardware infection  Unclear if infection lead to wound dehiscence or vice versa.  Hardware growing coag negative staph (methicillin sensitive) and therefore  concern for surrounding associated osteomyelitis and will need long course of  antibiotics. In addition new hardware placed.  Continue     CURRENT 6/6/20254:  Doing well. Recently hospitalized for asthma, now on supplemental o2  No issues with doxy.       PLAN:  Doxycycline 100 mg PO q12 for chronic suppression  RTC in 4-6 mo     Pt is in agreement with plan. States that all their questions were answered.        I spent 20 minutes in the professional and overall care of this patient.       Jesus Manuel Maciel MD MPH              I spent 30 minutes in the professional and overall care of this patient.      Jesus Manuel Maciel MD MPH

## 2024-06-10 DIAGNOSIS — J45.909 ASTHMA, UNSPECIFIED ASTHMA SEVERITY, UNSPECIFIED WHETHER COMPLICATED, UNSPECIFIED WHETHER PERSISTENT (HHS-HCC): Primary | ICD-10-CM

## 2024-06-10 PROBLEM — Z00.00 MEDICARE ANNUAL WELLNESS VISIT, SUBSEQUENT: Status: RESOLVED | Noted: 2024-02-05 | Resolved: 2024-06-10

## 2024-06-10 PROBLEM — E83.42 HYPOMAGNESEMIA: Status: ACTIVE | Noted: 2024-05-30

## 2024-06-10 PROBLEM — M75.21 BICEPS TENDINITIS OF RIGHT UPPER EXTREMITY: Status: RESOLVED | Noted: 2024-03-29 | Resolved: 2024-06-10

## 2024-06-10 PROBLEM — N39.0 URINARY TRACT INFECTION: Status: RESOLVED | Noted: 2024-06-10 | Resolved: 2024-06-10

## 2024-06-10 PROBLEM — Z86.19 HISTORY OF INFECTIOUS DISEASE: Status: RESOLVED | Noted: 2024-06-10 | Resolved: 2024-06-10

## 2024-06-10 PROBLEM — M62.81 MUSCLE WEAKNESS: Status: RESOLVED | Noted: 2024-04-29 | Resolved: 2024-06-10

## 2024-06-10 PROBLEM — Z86.79 HISTORY OF HYPERTENSION: Status: RESOLVED | Noted: 2024-06-10 | Resolved: 2024-06-10

## 2024-06-10 PROBLEM — M54.50 LOW BACK PAIN: Status: RESOLVED | Noted: 2024-04-29 | Resolved: 2024-06-10

## 2024-06-10 PROBLEM — R00.2 PALPITATIONS: Status: RESOLVED | Noted: 2024-06-10 | Resolved: 2024-06-10

## 2024-06-10 PROBLEM — M25.60 JOINT STIFFNESS: Status: RESOLVED | Noted: 2024-04-29 | Resolved: 2024-06-10

## 2024-06-10 PROBLEM — R26.2 DISABILITY OF WALKING: Status: ACTIVE | Noted: 2024-06-10

## 2024-06-10 PROBLEM — T81.9XXA COMPLICATION OF SURGICAL PROCEDURE: Status: RESOLVED | Noted: 2024-04-29 | Resolved: 2024-06-10

## 2024-06-10 PROBLEM — R53.1 WEAKNESS: Status: RESOLVED | Noted: 2024-06-10 | Resolved: 2024-06-10

## 2024-06-11 ENCOUNTER — TELEPHONE (OUTPATIENT)
Dept: PRIMARY CARE | Facility: CLINIC | Age: 74
End: 2024-06-11
Payer: COMMERCIAL

## 2024-06-24 ENCOUNTER — APPOINTMENT (OUTPATIENT)
Dept: PRIMARY CARE | Facility: CLINIC | Age: 74
End: 2024-06-24
Payer: COMMERCIAL

## 2024-07-06 DIAGNOSIS — I10 BENIGN ESSENTIAL HYPERTENSION: ICD-10-CM

## 2024-07-08 RX ORDER — OLMESARTAN MEDOXOMIL 20 MG/1
20 TABLET ORAL DAILY
Qty: 90 TABLET | Refills: 1 | Status: SHIPPED
Start: 2024-07-08 | End: 2024-07-08 | Stop reason: SDUPTHER

## 2024-07-08 RX ORDER — OLMESARTAN MEDOXOMIL 20 MG/1
20 TABLET ORAL DAILY
Qty: 90 TABLET | Refills: 1 | Status: SHIPPED | OUTPATIENT
Start: 2024-07-08

## 2024-07-17 ENCOUNTER — TELEPHONE (OUTPATIENT)
Dept: PRIMARY CARE | Facility: CLINIC | Age: 74
End: 2024-07-17
Payer: COMMERCIAL

## 2024-08-06 ENCOUNTER — APPOINTMENT (OUTPATIENT)
Dept: PULMONOLOGY | Facility: CLINIC | Age: 74
End: 2024-08-06
Payer: COMMERCIAL

## 2024-08-07 ENCOUNTER — APPOINTMENT (OUTPATIENT)
Dept: PRIMARY CARE | Facility: CLINIC | Age: 74
End: 2024-08-07
Payer: COMMERCIAL

## 2024-08-16 ENCOUNTER — APPOINTMENT (OUTPATIENT)
Dept: RADIOLOGY | Facility: CLINIC | Age: 74
End: 2024-08-16
Payer: COMMERCIAL

## 2024-08-21 ENCOUNTER — APPOINTMENT (OUTPATIENT)
Dept: PULMONOLOGY | Facility: CLINIC | Age: 74
End: 2024-08-21
Payer: COMMERCIAL

## 2024-08-23 DIAGNOSIS — E78.00 HYPERCHOLESTEROLEMIA: ICD-10-CM

## 2024-08-24 ENCOUNTER — APPOINTMENT (OUTPATIENT)
Dept: RADIOLOGY | Facility: CLINIC | Age: 74
End: 2024-08-24
Payer: COMMERCIAL

## 2024-08-26 RX ORDER — ATORVASTATIN CALCIUM 20 MG/1
20 TABLET, FILM COATED ORAL DAILY
Qty: 90 TABLET | Refills: 0 | Status: SHIPPED | OUTPATIENT
Start: 2024-08-26

## 2024-09-05 ENCOUNTER — APPOINTMENT (OUTPATIENT)
Dept: PRIMARY CARE | Facility: CLINIC | Age: 74
End: 2024-09-05
Payer: COMMERCIAL

## 2024-09-09 ENCOUNTER — APPOINTMENT (OUTPATIENT)
Dept: PRIMARY CARE | Facility: CLINIC | Age: 74
End: 2024-09-09
Payer: COMMERCIAL

## 2024-09-09 VITALS — SYSTOLIC BLOOD PRESSURE: 110 MMHG | HEART RATE: 65 BPM | RESPIRATION RATE: 16 BRPM | DIASTOLIC BLOOD PRESSURE: 70 MMHG

## 2024-09-09 DIAGNOSIS — R06.02 SOB (SHORTNESS OF BREATH) ON EXERTION: ICD-10-CM

## 2024-09-09 DIAGNOSIS — R79.0 LOW MAGNESIUM LEVEL: ICD-10-CM

## 2024-09-09 DIAGNOSIS — R00.2 PALPITATIONS WITH REGULAR CARDIAC RHYTHM: Primary | ICD-10-CM

## 2024-09-09 DIAGNOSIS — I10 PRIMARY HYPERTENSION: ICD-10-CM

## 2024-09-09 DIAGNOSIS — Z12.31 ENCOUNTER FOR SCREENING MAMMOGRAM FOR BREAST CANCER: ICD-10-CM

## 2024-09-09 PROBLEM — J96.01 ACUTE RESPIRATORY FAILURE WITH HYPOXIA (MULTI): Status: ACTIVE | Noted: 2024-08-11

## 2024-09-09 PROBLEM — N17.9 AKI (ACUTE KIDNEY INJURY) (CMS-HCC): Status: ACTIVE | Noted: 2024-08-11

## 2024-09-09 PROCEDURE — 1125F AMNT PAIN NOTED PAIN PRSNT: CPT | Performed by: INTERNAL MEDICINE

## 2024-09-09 PROCEDURE — 99214 OFFICE O/P EST MOD 30 MIN: CPT | Performed by: INTERNAL MEDICINE

## 2024-09-09 PROCEDURE — 3074F SYST BP LT 130 MM HG: CPT | Performed by: INTERNAL MEDICINE

## 2024-09-09 PROCEDURE — 93000 ELECTROCARDIOGRAM COMPLETE: CPT | Performed by: INTERNAL MEDICINE

## 2024-09-09 PROCEDURE — 3078F DIAST BP <80 MM HG: CPT | Performed by: INTERNAL MEDICINE

## 2024-09-09 PROCEDURE — 1159F MED LIST DOCD IN RCRD: CPT | Performed by: INTERNAL MEDICINE

## 2024-09-09 PROCEDURE — 1036F TOBACCO NON-USER: CPT | Performed by: INTERNAL MEDICINE

## 2024-09-09 PROCEDURE — 1160F RVW MEDS BY RX/DR IN RCRD: CPT | Performed by: INTERNAL MEDICINE

## 2024-09-09 RX ORDER — TRAZODONE HYDROCHLORIDE 50 MG/1
50 TABLET ORAL NIGHTLY
COMMUNITY
Start: 2024-08-06

## 2024-09-09 RX ORDER — MINOXIDIL 2.5 MG/1
2.5 TABLET ORAL DAILY
COMMUNITY
Start: 2024-03-25

## 2024-09-09 RX ORDER — METOPROLOL TARTRATE 25 MG/1
25 TABLET, FILM COATED ORAL 2 TIMES DAILY
COMMUNITY
Start: 2024-08-28

## 2024-09-09 RX ORDER — FINASTERIDE 5 MG/1
5 TABLET, FILM COATED ORAL DAILY
COMMUNITY
Start: 2024-03-25

## 2024-09-09 RX ORDER — CYCLOSPORINE 0.5 MG/ML
1 EMULSION OPHTHALMIC AS NEEDED
COMMUNITY

## 2024-09-09 RX ORDER — APIXABAN 2.5 MG/1
2.5 TABLET, FILM COATED ORAL 2 TIMES DAILY
COMMUNITY
Start: 2024-08-28

## 2024-09-09 RX ORDER — FLUTICASONE PROPIONATE AND SALMETEROL 55; 14 UG/1; UG/1
1 POWDER, METERED RESPIRATORY (INHALATION) 2 TIMES DAILY
COMMUNITY
Start: 2024-08-13

## 2024-09-09 RX ORDER — LANOLIN ALCOHOL/MO/W.PET/CERES
400 CREAM (GRAM) TOPICAL
COMMUNITY
Start: 2024-08-28 | End: 2024-09-27

## 2024-09-09 ASSESSMENT — ENCOUNTER SYMPTOMS
LOSS OF SENSATION IN FEET: 0
ARTHRALGIAS: 1
DEPRESSION: 0
OCCASIONAL FEELINGS OF UNSTEADINESS: 0
PALPITATIONS: 1

## 2024-09-09 ASSESSMENT — PATIENT HEALTH QUESTIONNAIRE - PHQ9
2. FEELING DOWN, DEPRESSED OR HOPELESS: NOT AT ALL
1. LITTLE INTEREST OR PLEASURE IN DOING THINGS: NOT AT ALL
SUM OF ALL RESPONSES TO PHQ9 QUESTIONS 1 AND 2: 0

## 2024-09-09 ASSESSMENT — PAIN SCALES - GENERAL: PAINLEVEL: 8

## 2024-09-09 NOTE — PROGRESS NOTES
Subjective   Patient ID: Emy Potts is a 73 y.o. female who presents for Rapid Heart Rate and Shoulder Pain (Right).    Follow-up visit.  She is in the room with her .  6 months ago she was seen by pain management specialist for an epidural injection and found to have advanced heart rate, around 150 bpm.  After that visit she was checking her heart rate at home and it was around 100 bpm.  She has had another episode of tachycardia was seen at [facility] admitted for a short period of time.   Started on Eliquis 2.5 mg twice a day and metoprolol titrate 25 mg twice a day.   Denies palpitations today.  She has hypertension, osteoporosis, chronic pain right shoulder, is planning surgery but is postponed due to tachycardia.          Rapid Heart Rate    Associated symptoms include palpitations.   Shoulder Pain          Review of Systems   Cardiovascular:  Positive for palpitations.   Musculoskeletal:  Positive for arthralgias.   All other systems reviewed and are negative.      Objective   /70 (BP Location: Left arm)   Pulse 65   Resp 16     Physical Exam  Constitutional:       Appearance: Normal appearance.   HENT:      Head: Normocephalic and atraumatic.      Right Ear: External ear normal.      Left Ear: External ear normal.      Mouth/Throat:      Mouth: Mucous membranes are moist.      Pharynx: Oropharynx is clear.   Neck:      Vascular: No carotid bruit.   Cardiovascular:      Rate and Rhythm: Normal rate and regular rhythm.      Heart sounds: No murmur heard.     No gallop.   Pulmonary:      Effort: Pulmonary effort is normal. No respiratory distress.      Breath sounds: No wheezing or rales.   Abdominal:      General: Abdomen is flat.      Palpations: Abdomen is soft.      Hernia: No hernia is present.   Musculoskeletal:         General: No swelling, tenderness, deformity or signs of injury.      Cervical back: No rigidity or tenderness.      Right lower leg: No edema.      Comments:  Limited range of motion right shoulder due to pain.   Lymphadenopathy:      Cervical: No cervical adenopathy.   Skin:     Coloration: Skin is not jaundiced or pale.      Findings: No bruising, erythema, lesion or rash.   Neurological:      General: No focal deficit present.      Mental Status: She is oriented to person, place, and time.      Motor: No weakness.      Coordination: Coordination normal.   Psychiatric:         Mood and Affect: Mood normal.         Behavior: Behavior normal.         Assessment/Plan   Problem List Items Addressed This Visit             ICD-10-CM    HTN (hypertension) I10     Hypertension : controlled blood pressure and continues same medications and educate a low salt diet do not exceed 200 mg per serving. Keep monitor the blood pressure at home.             Relevant Orders    Comprehensive Metabolic Panel    Lipid Panel    Palpitations with regular cardiac rhythm - Primary R00.2     EKG done in the office shows sinus bradycardia heart rate 59.  Recommend 2 weeks Holter monitor.  Have echocardiogram.  Referral made to see cardiologist electrophysiologist.         Relevant Orders    Holter or Event Cardiac Monitor    Referral to Cardiology    TSH with reflex to Free T4 if abnormal    ECG 12 Lead (Completed)     Other Visit Diagnoses         Codes    Encounter for screening mammogram for breast cancer     Z12.31    Relevant Orders    BI mammo bilateral screening tomosynthesis    SOB (shortness of breath) on exertion     R06.02    Relevant Orders    Transthoracic Echo (TTE) Complete    Low magnesium level     R79.0    Relevant Orders    Magnesium

## 2024-09-09 NOTE — ASSESSMENT & PLAN NOTE
EKG done in the office shows sinus bradycardia heart rate 59.  Recommend 2 weeks Holter monitor.  Have echocardiogram.  Referral made to see cardiologist electrophysiologist.

## 2024-09-10 ENCOUNTER — APPOINTMENT (OUTPATIENT)
Dept: PRIMARY CARE | Facility: CLINIC | Age: 74
End: 2024-09-10
Payer: COMMERCIAL

## 2024-09-23 DIAGNOSIS — J45.909 ASTHMA, UNSPECIFIED ASTHMA SEVERITY, UNSPECIFIED WHETHER COMPLICATED, UNSPECIFIED WHETHER PERSISTENT (HHS-HCC): ICD-10-CM

## 2024-09-23 RX ORDER — ALBUTEROL SULFATE 90 UG/1
2 INHALANT RESPIRATORY (INHALATION) EVERY 4 HOURS PRN
Qty: 18 G | Refills: 2 | Status: SHIPPED | OUTPATIENT
Start: 2024-09-23

## 2024-09-27 ENCOUNTER — HOSPITAL ENCOUNTER (OUTPATIENT)
Dept: CARDIOLOGY | Facility: CLINIC | Age: 74
Discharge: HOME | End: 2024-09-27
Payer: COMMERCIAL

## 2024-09-27 DIAGNOSIS — R06.02 SOB (SHORTNESS OF BREATH) ON EXERTION: ICD-10-CM

## 2024-09-27 DIAGNOSIS — R06.02 SHORTNESS OF BREATH: Primary | ICD-10-CM

## 2024-09-27 DIAGNOSIS — R00.2 PALPITATIONS WITH REGULAR CARDIAC RHYTHM: ICD-10-CM

## 2024-09-27 LAB
AORTIC VALVE MEAN GRADIENT: 8.3 MMHG
AORTIC VALVE PEAK VELOCITY: 2.09 M/S
AV PEAK GRADIENT: 17.4 MMHG
AVA (PEAK VEL): 1.4 CM2
AVA (VTI): 1.52 CM2
EJECTION FRACTION APICAL 4 CHAMBER: 68.4
EJECTION FRACTION: 68 %
LEFT ATRIUM VOLUME AREA LENGTH INDEX BSA: 38.8 ML/M2
LEFT VENTRICLE INTERNAL DIMENSION DIASTOLE: 4.28 CM (ref 3.5–6)
LEFT VENTRICULAR OUTFLOW TRACT DIAMETER: 1.8 CM
MITRAL VALVE E/A RATIO: 0.92
RIGHT VENTRICLE FREE WALL PEAK S': 12 CM/S
RIGHT VENTRICLE PEAK SYSTOLIC PRESSURE: 60.8 MMHG

## 2024-09-27 PROCEDURE — 93306 TTE W/DOPPLER COMPLETE: CPT

## 2024-09-27 PROCEDURE — 93306 TTE W/DOPPLER COMPLETE: CPT | Performed by: INTERNAL MEDICINE

## 2024-09-30 ENCOUNTER — OFFICE VISIT (OUTPATIENT)
Dept: CARDIOLOGY | Facility: CLINIC | Age: 74
End: 2024-09-30
Payer: COMMERCIAL

## 2024-09-30 VITALS
OXYGEN SATURATION: 99 % | BODY MASS INDEX: 25.11 KG/M2 | DIASTOLIC BLOOD PRESSURE: 92 MMHG | HEIGHT: 66 IN | SYSTOLIC BLOOD PRESSURE: 131 MMHG | HEART RATE: 61 BPM

## 2024-09-30 DIAGNOSIS — E78.00 HYPERCHOLESTEROLEMIA: Primary | ICD-10-CM

## 2024-09-30 PROCEDURE — 3080F DIAST BP >= 90 MM HG: CPT | Performed by: INTERNAL MEDICINE

## 2024-09-30 PROCEDURE — 99204 OFFICE O/P NEW MOD 45 MIN: CPT | Performed by: INTERNAL MEDICINE

## 2024-09-30 PROCEDURE — 99214 OFFICE O/P EST MOD 30 MIN: CPT | Performed by: INTERNAL MEDICINE

## 2024-09-30 PROCEDURE — 3075F SYST BP GE 130 - 139MM HG: CPT | Performed by: INTERNAL MEDICINE

## 2024-09-30 PROCEDURE — 1125F AMNT PAIN NOTED PAIN PRSNT: CPT | Performed by: INTERNAL MEDICINE

## 2024-09-30 PROCEDURE — 1159F MED LIST DOCD IN RCRD: CPT | Performed by: INTERNAL MEDICINE

## 2024-09-30 RX ORDER — METOPROLOL TARTRATE 25 MG/1
25 TABLET, FILM COATED ORAL 2 TIMES DAILY
Qty: 180 TABLET | Refills: 3 | Status: SHIPPED | OUTPATIENT
Start: 2024-09-30 | End: 2025-09-30

## 2024-09-30 ASSESSMENT — ENCOUNTER SYMPTOMS: OCCASIONAL FEELINGS OF UNSTEADINESS: 1

## 2024-09-30 ASSESSMENT — COLUMBIA-SUICIDE SEVERITY RATING SCALE - C-SSRS
2. HAVE YOU ACTUALLY HAD ANY THOUGHTS OF KILLING YOURSELF?: NO
6. HAVE YOU EVER DONE ANYTHING, STARTED TO DO ANYTHING, OR PREPARED TO DO ANYTHING TO END YOUR LIFE?: NO
1. IN THE PAST MONTH, HAVE YOU WISHED YOU WERE DEAD OR WISHED YOU COULD GO TO SLEEP AND NOT WAKE UP?: NO

## 2024-09-30 ASSESSMENT — PAIN SCALES - GENERAL: PAINLEVEL: 10-WORST PAIN EVER

## 2024-09-30 NOTE — PROGRESS NOTES
Primary Care Physician: Mely Brar MD  Date of Visit: 09/30/2024  9:40 AM EDT  Location of visit: [facility] [address]     Chief Complaint:   No chief complaint on file.       HPI / Summary:   Emy Potts is a 73 y.o. female presents for urgent new CV evaluation for kim op CV assessment.  August admit for a fall.  5/2024 Admit hypoxic resp failure.  HTN DL,asthma chronic oxygen, OA, chronic pain.    CV testing:  Echo 9/27/2024 normal ef, pseudonormal diastolic filling, mild aortic stenosis, Mild LAE, moderate elevation RVSP 61 mm Hg.,    Shoulder repair Wednesday after a fall.    2 weeks ago had a fall    In [facility] end of August, was checking pulse ox and heart rate 150, felt heart race.    New symptom  Seen at [facility].  Sent home from ER.  Spontaneously converted.    Went back with high HR next day.  On metop and Eliquis.  Held off on Holter 2/2 surgery.    EKG 0n 9/9/2024 sinus guzman.    No prior h/o CVA, MI, Dvt , PE    4 spine surgeries.  Brain surgeries x 2 (2019 staph infection for 6-10 weeks after one surgery) 2/2 slow brain bleed.Wound dehisence 2021 had mesh replaced.    Left shoulder replacement.    Ectopic pregnancy  Stopped cigs 2015, no etoh.  No cancer,  lung liver or renal disease, h/o PNA    Didn't see cards at [facility].    No CP, palpitations, fall was mechanical    2 children.        Specialty Problems          Cardiology Problems    Hypotension     Formatting of this note might be different from the original. Hypotensive to SBP 70s prompting AMET call. Fluid resuscitated and transfused 3u PRBCs for acute blood loss anemia. Now HDS.   -- Transfuse blood products and IVF boluses as needed Last Assessment & Plan: Formatting of this note might be different from the original. Assessment: h/o hypotension; currently being treated for HTN - BP today well-controlled         HTN (hypertension)     Last Assessment & Plan: Formatting of this note might be different from the original. Assessment:  well-controlled Pt was recently put on a second BP med by PCP - she is unsure of the name -- she has gotten some dizziness after starting this med - advised pt to discuss with PCP         Benign essential hypertension    Hypercholesterolemia    Palpitations with regular cardiac rhythm        Past Medical History:   Diagnosis Date    Acute left ankle pain 01/08/2024    Acute pain due to trauma 01/08/2024    Biceps tendinitis of right upper extremity 03/29/2024    Complication of surgical procedure 04/29/2024    Contracture of left shoulder 08/07/2020    History of hypertension 06/10/2024    History of infectious disease 06/10/2024    History of subdural hematoma 08/07/2020    Last Assessment & Plan: Formatting of this note might be different from the original. Assessment: H/o subdural hematoma after falling out of bed s/p surgery with subsequent procedure for staph infection - on doxy for hx of staph infection Residual deficits include loss of sensation of right hand and proprioception issue of right hand    Injury of tendon of long head of biceps, left, initial encounter 08/07/2020    Joint stiffness 04/29/2024    Low back pain 04/29/2024    Muscle weakness 04/29/2024    Pain in left shoulder 08/20/2020    Palpitations 06/10/2024    Personal history of other diseases of the circulatory system 01/14/2014    History of hypertension    Personal history of other diseases of the circulatory system 07/01/2020    History of subdural hematoma    Personal history of other diseases of the musculoskeletal system and connective tissue 06/26/2018    History of low back pain    Personal history of other infectious and parasitic diseases 07/01/2020    History of staphylococcal infection    Personal history of other specified conditions 06/26/2018    History of insomnia    Personal history of pneumonia (recurrent) 07/17/2018    History of pneumonia    Unspecified asthma, uncomplicated ([facility]) 03/19/2017    Asthma    Urinary tract  infection 06/10/2024    Weakness 06/10/2024          Past Surgical History:   Procedure Laterality Date    BACK SURGERY  01/14/2014    Back Surgery    CT HEAD ANGIO W AND WO IV CONTRAST  8/18/2019    CT HEAD ANGIO W AND WO IV CONTRAST 8/18/2019 [facility]    MR HEAD ANGIO WO IV CONTRAST  8/2/2019    MR HEAD ANGIO WO IV CONTRAST 8/2/2019 [facility]ICAL [facility]GAC[facility]    MR NECK ANGIO WO IV CONTRAST  8/2/2019    MR NECK ANGIO WO IV CONTRAST 8/2/2019 [facility] CL[facility]ICAL [facility]GAC[facility]    NECK SURGERY  01/14/2014    Neck Surgery    OTHER SURGICAL HISTORY  01/14/2014    General Surgery    OTHER SURGICAL HISTORY  07/01/2020    Brain surgery    OTHER SURGICAL HISTORY  07/15/2022    Ectopic pregnancy removal with salpingectomy          Social History:   reports that she has quit smoking. Her smoking use included cigarettes. She has never used smokeless tobacco. She reports that she does not currently use alcohol. She reports that she does not use drugs.      Allergies:  Allergies   Allergen Reactions    Allerg Xt,D.Farinae-D.Pteronys Unknown     Other Reaction(s): Other: See Comments      Sneezing    Cat Hair Standardized Allergenic Extract Unknown     Other Reaction(s): Other: See Comments      sneezing       Outpatient Medications:  Current Outpatient Medications   Medication Instructions    albuterol 90 mcg/actuation inhaler 2 puffs, inhalation, Every 4 hours PRN    alclometasone (Aclovate) 0.05 % ointment Topical, 2 times daily    alendronate (FOSAMAX) 70 mg, oral, Every 7 days, Take in the morning with a full glass of water, on an empty stomach, and do not take anything else by mouth or lie down for the next 30 min.    atorvastatin (LIPITOR) 20 mg, oral, Daily    cholecalciferol (Vitamin D-3) 25 MCG (1000 UT) tablet 1 tablet, oral, Daily    cycloSPORINE (Restasis) 0.05 % ophthalmic emulsion 1 drop, Both Eyes, As needed    doxycycline (VIBRAMYCIN) 100 mg, oral, Every 12 hours    Eliquis 2.5 mg, oral, 2 times daily    finasteride  (PROSCAR) 5 mg, oral, Daily    fluticasone propion-salmeteroL (AirDuo RespiClick) 55-14 mcg/actuation inhaler 1 puff, inhalation, 2 times daily    gabapentin (Neurontin) 400 mg capsule 1 capsule, oral, 4 times daily    metoprolol tartrate (LOPRESSOR) 25 mg, oral, 2 times daily    minoxidil (LONITEN) 2.5 mg, oral, Daily    nystatin (Mycostatin) cream Topical, As needed, Apply to affected areas 2-3 times daily    olmesartan (BENICAR) 20 mg, oral, Daily    tiZANidine (Zanaflex) 2 mg tablet oral, 4 times daily PRN    traMADol (Ultram) 50 mg tablet 1 tablet, oral, Every 4 hours    traZODone (DESYREL) 50 mg, oral, Nightly       ROS     Physical Exam:  There were no vitals filed for this visit.  Wt Readings from Last 5 Encounters:   02/28/24 69.5 kg (153 lb 3.5 oz)   02/05/24 69.5 kg (153 lb 3.5 oz)   06/29/23 66.7 kg (147 lb)   06/26/23 66.7 kg (147 lb)   07/15/22 70.8 kg (156 lb 2 oz)     There is no height or weight on file to calculate BMI.   Somewhat frail-appearing female.  While sitting upright in a wheelchair her neck veins did not seem distended.  Her rhythm seemed irregular with a heart rate in the low 90s.  Normal carotid upstrokes.  Clear lungs without crackles or wheezes.  Soft abdomen.  Ankle edema of the left greater than the right with intact pedal pulses     Last Labs:  CMP:  Recent Labs     02/01/24 0818 09/22/22  0607 09/20/22  1933 10/28/21  1030 10/20/21  1100    141 136 142 140   K 5.3 4.0 3.9 4.1 3.6   CL 98 102 97* 103 99   CO2 33* 28 30 31 30   ANIONGAP 15 15 13 12 15   BUN 33* 22 51* 37* 38*   CREATININE 0.75 0.67 1.17* 0.70 0.84   EGFR 85  --   --   --   --    GLUCOSE 93 93 82 92 47*     Recent Labs     02/01/24 0818 09/20/22 1933 09/26/21  0537 09/24/21  0752 09/22/21  0508 08/05/20  1010 06/10/20  1359 10/01/19  1100 07/24/19  0448 03/26/19  2149   ALBUMIN 4.2 4.3 3.7 4.2 3.9 4.9 4.5 4.5   < > 4.6   ALKPHOS 37 45  --   --   --  68 43 47   < > 49   ALT 14 35  --   --   --  18 22 22   <  > 18   AST 15 69*  --   --   --  18 18 20   < > 21   BILITOT 0.6 0.8  --   --   --  0.7 0.9 0.5   < > 0.4   LIPASE  --   --   --   --   --   --   --   --   --  15    < > = values in this interval not displayed.     CBC:  Recent Labs     02/01/24  0818 09/22/22  0607 09/20/22  1933 10/28/21  1030 10/20/21  1100   WBC 7.4 4.0* 6.4 4.4 4.1*   HGB 13.4 10.9* 12.4 13.4 11.2*   HCT 40.4 35.1* 38.7 42.2 37.0    204 207 231 214   MCV 91 101* 99 99 102*     COAG:   Recent Labs     09/24/21  0752 09/22/21  0508 08/05/20  1010 06/10/20  1359 08/13/19  0926   INR 0.9 0.9 1.0 1.0 1.1     HEME/ENDO:  Recent Labs     02/01/24  0818 09/03/18  1402 07/04/18  1508 07/04/18  0553   TSH 3.15 2.70 1.54 1.94      CARDIAC:   Recent Labs     03/26/19  2149   BNP 16     Recent Labs     02/01/24  0818 04/25/18  0850   CHOL 276* 260*   LDLF  --  168*   HDL 76.9 73.4   TRIG 79 95       Last Cardiology Tests:  ECG:      Echo:  Echo Results:  Transthoracic Echo (TTE) Complete 09/27/2024    [facility], [address] [place] [address]  Tel [phone] and Fax [phone]    TRANSTHORACIC ECHOCARDIOGRAM REPORT      Patient Name:      MACY Mayers Physician:    [id number] Wali Rogers MD  Study Date:        9/27/2024            Ordering Provider:    [id number] PARDEEP MCCLELLAND  MRN/PID:           [id number]             Fellow:  Accession#:        [id number]         Nurse:  Date of Birth/Age: 1950 / 73      Sonographer:          Frederick verdugo                                      [facility]  Gender:            F                    Additional Staff:  Height:            160.02 cm            Admit Date:  Weight:            69.40 kg             Admission Status:     Outpatient  BSA / BMI:         1.73 m2 / 27.10      Encounter#:           [id number]  kg/m2  Blood Pressure:    /                    Department Location:  [facility]  Echo Lab    Study Type:    TRANSTHORACIC  ECHO (TTE) COMPLETE  Diagnosis/ICD: Shortness of breath-R06.02  Indication:    SOB (shortness of breath)  CPT Code:      Echo Complete w Full Doppler-[id number]    Patient History:  Pertinent History: HTN; CKD: SOB; JULIANNA.    Study Detail: The following Echo studies were performed: 2D, M-Mode, Doppler and  color flow. Technically challenging study due to body habitus,  patient lying in supine position and poor acoustic windows.      PHYSICIAN INTERPRETATION:  Left Ventricle: Left ventricular ejection fraction is normal, by visual estimate at 65-70%. There are no regional left ventricular wall motion abnormalities. The left ventricular cavity size is normal. Spectral Doppler shows a pseudonormal pattern of left ventricular diastolic filling.  Left Atrium: The left atrium is mildly dilated.  Right Ventricle: The right ventricle is normal in size. There is normal right ventricular global systolic function. RV S'= 12 mm/s.  Right Atrium: The right atrium is normal in size.  Aortic Valve: The aortic valve is probably trileaflet. There is mild aortic valve cusp calcification. There is evidence of mild aortic valve stenosis. The aortic valve dimensionless index is 0.60. The peak aortic velocity was obtained from the apical view. There is no evidence of aortic valve regurgitation. The peak instantaneous gradient of the aortic valve is 17.4 mmHg. The mean gradient of the aortic valve is 8.3 mmHg.  Mitral Valve: The mitral valve is mildly thickened. There is mild mitral annular calcification. There is no evidence of mitral valve regurgitation.  Tricuspid Valve: The tricuspid valve is structurally normal. There is mild tricuspid regurgitation. The Doppler estimated RVSP is moderately elevated at 60.8 mmHg.  Pulmonic Valve: The pulmonic valve is not well visualized. The pulmonic valve regurgitation was not well visualized.  Pericardium: There is no pericardial effusion noted. There is an anterior clear space.  Aorta: The aortic root  is normal.  Systemic Veins: The inferior vena cava appears normal in size, with IVC inspiratory collapse less than 50%.  In comparison to the previous echocardiogram(s): There are no prior studies on this patient for comparison purposes.      CONCLUSIONS:  1. Left ventricular ejection fraction is normal, by visual estimate at 65-70%.  2. Spectral Doppler shows a pseudonormal pattern of left ventricular diastolic filling.  3. There is normal right ventricular global systolic function.  4. The left atrium is mildly dilated.  5. Moderately elevated right ventricular systolic pressure.  6. Mild aortic valve stenosis.  7. The inferior vena cava appears normal in size, with IVC inspiratory collapse less than 50%.    QUANTITATIVE DATA SUMMARY:    2D MEASUREMENTS:          Normal Ranges:  Ao Root d:       2.88 cm  (2.0-3.7cm)  LAs:             4.16 cm  (2.7-4.0cm)  IVSd:            1.06 cm  (0.6-1.1cm)  LVPWd:           0.78 cm  (0.6-1.1cm)  LVIDd:           4.28 cm  (3.9-5.9cm)  LVIDs:           2.89 cm  LV Mass Index:   73 g/m2  LVEDV Index:     48 ml/m2  LV % FS          32.5 %      LA VOLUME:                   Normal Ranges:  LA Vol A4C:       64.4 ml    (22+/-6mL/m2)  LA Vol A2C:       67.3 ml  LA Vol BP:        67.0 ml  LA Vol Index A4C: 37.3ml/m2  LA Vol Index A2C: 39.0 ml/m2  LA Vol Index BP:  38.8 ml/m2  LA Volume Index:  38.7 ml/m2  LA Vol A4C:       60.8 ml  LA Vol A2C:       65.1 ml  LA Vol Index BSA: 36.5 ml/m2      LV SYSTOLIC FUNCTION BY 2D PLANIMETRY (MOD):  Normal Ranges:  EF-A4C View:    68 % (>=55%)  EF-A2C View:    71 %  EF-Biplane:     69 %  EF-Visual:      68 %  LV EF Reported: 68 %      LV DIASTOLIC FUNCTION:             Normal Ranges:  MV Peak E:             0.80 m/s    (0.7-1.2 m/s)  MV Peak A:             0.87 m/s    (0.42-0.7 m/s)  E/A Ratio:             0.92        (1.0-2.2)  MV e'                  0.065 m/s   (>8.0)  MV lateral e'          0.07 m/s  MV medial e'           0.06 m/s  MV A Dur:               137.01 msec  E/e' Ratio:            12.30       (<8.0)  MV DT:                 263 msec    (150-240 msec)      AORTIC VALVE:                      Normal Ranges:  AoV Vmax:                2.09 m/s  (<=1.7m/s)  AoV Peak P.4 mmHg (<20mmHg)  AoV Mean P.3 mmHg  (1.7-11.5mmHg)  LVOT Max Tony:            1.15 m/s  (<=1.1m/s)  AoV VTI:                 42.81 cm  (18-25cm)  LVOT VTI:                25.64 cm  LVOT Diameter:           1.80 cm   (1.8-2.4cm)  AoV Area, VTI:           1.52 cm2  (2.5-5.5cm2)  AoV Area,Vmax:           1.40 cm2  (2.5-4.5cm2)  AoV Dimensionless Index: 0.60      RIGHT VENTRICLE:  RV Basal 2.66 cm  RV Mid   2.10 cm  RV Major 5.7 cm  RV s'    0.12 m/s      TRICUSPID VALVE/RVSP:          Normal Ranges:  Peak TR Velocity:     3.38 m/s  RV Syst Pressure:     61 mmHg  (< 30mmHg)      PULMONIC VALVE:          Normal Ranges:  PV Max Tony:     0.6 m/s  (0.6-0.9m/s)  PV Max P.5 mmHg      [id number] Wali Rogers MD  Electronically signed on 2024 at 12:01:41 PM        ** Final **       Cath:      Stress Test:  Stress Results:  No results found for this or any previous visit from the past 365 days.         Cardiac Imaging:  Transthoracic Echo (TTE) [facility] [place] [address]                       Tel [phone] and Fax [phone]    TRANSTHORACIC ECHOCARDIOGRAM REPORT       Patient Name:      MACY Mayers Physician:    [id number] Wali Rogers MD  Study Date:        2024            Ordering Provider:    [id number] PARDEEP MCCLELLAND  MRN/PID:           [id number]             Fellow:  Accession#:        [id number]         Nurse:  Date of Birth/Age: 1950      Sonographer:          Frederick Choe                      years                                      [facility]  Gender:            F                    Additional Staff:  Height:            160.02 cm            Admit Date:  Weight:            69.40 kg             Admission Status:     Outpatient  BSA / BMI:         1.73 m2 / 27.10      Encounter#:           [id number]                     kg/m2  Blood Pressure:    /                    Department Location:  [facility]                                                                Echo Lab    Study Type:    TRANSTHORACIC ECHO (TTE) COMPLETE  Diagnosis/ICD: Shortness of breath-R06.02  Indication:    SOB (shortness of breath)  CPT Code:      Echo Complete w Full Doppler-[id number]    Patient History:  Pertinent History: HTN; CKD: SOB; JULIANNA.    Study Detail: The following Echo studies were performed: 2D, M-Mode, Doppler and                color flow. Technically challenging study due to body habitus,                patient lying in supine position and poor acoustic windows.       PHYSICIAN INTERPRETATION:  Left Ventricle: Left ventricular ejection fraction is normal, by visual estimate at 65-70%. There are no regional left ventricular wall motion abnormalities. The left ventricular cavity size is normal. Spectral Doppler shows a pseudonormal pattern of left ventricular diastolic filling.  Left Atrium: The left atrium is mildly dilated.  Right Ventricle: The right ventricle is normal in size. There is normal right ventricular global systolic function. RV S'= 12 mm/s.  Right Atrium: The right atrium is normal in size.  Aortic Valve: The aortic valve is probably trileaflet. There is mild aortic valve cusp calcification. There is evidence of mild aortic valve stenosis. The aortic valve dimensionless index is 0.60. The peak aortic velocity was obtained from the apical view. There is no evidence of aortic valve regurgitation. The peak instantaneous gradient of the aortic valve is 17.4 mmHg. The mean gradient of the aortic valve is 8.3  mmHg.  Mitral Valve: The mitral valve is mildly thickened. There is mild mitral annular calcification. There is no evidence of mitral valve regurgitation.  Tricuspid Valve: The tricuspid valve is structurally normal. There is mild tricuspid regurgitation. The Doppler estimated RVSP is moderately elevated at 60.8 mmHg.  Pulmonic Valve: The pulmonic valve is not well visualized. The pulmonic valve regurgitation was not well visualized.  Pericardium: There is no pericardial effusion noted. There is an anterior clear space.  Aorta: The aortic root is normal.  Systemic Veins: The inferior vena cava appears normal in size, with IVC inspiratory collapse less than 50%.  In comparison to the previous echocardiogram(s): There are no prior studies on this patient for comparison purposes.       CONCLUSIONS:   1. Left ventricular ejection fraction is normal, by visual estimate at 65-70%.   2. Spectral Doppler shows a pseudonormal pattern of left ventricular diastolic filling.   3. There is normal right ventricular global systolic function.   4. The left atrium is mildly dilated.   5. Moderately elevated right ventricular systolic pressure.   6. Mild aortic valve stenosis.   7. The inferior vena cava appears normal in size, with IVC inspiratory collapse less than 50%.    QUANTITATIVE DATA SUMMARY:     2D MEASUREMENTS:          Normal Ranges:  Ao Root d:       2.88 cm  (2.0-3.7cm)  LAs:             4.16 cm  (2.7-4.0cm)  IVSd:            1.06 cm  (0.6-1.1cm)  LVPWd:           0.78 cm  (0.6-1.1cm)  LVIDd:           4.28 cm  (3.9-5.9cm)  LVIDs:           2.89 cm  LV Mass Index:   73 g/m2  LVEDV Index:     48 ml/m2  LV % FS          32.5 %       LA VOLUME:                   Normal Ranges:  LA Vol A4C:       64.4 ml    (22+/-6mL/m2)  LA Vol A2C:       67.3 ml  LA Vol BP:        67.0 ml  LA Vol Index A4C: 37.3ml/m2  LA Vol Index A2C: 39.0 ml/m2  LA Vol Index BP:  38.8 ml/m2  LA Volume Index:  38.7 ml/m2  LA Vol A4C:       60.8 ml  LA  Vol A2C:       65.1 ml  LA Vol Index BSA: 36.5 ml/m2       LV SYSTOLIC FUNCTION BY 2D PLANIMETRY (MOD):                       Normal Ranges:  EF-A4C View:    68 % (>=55%)  EF-A2C View:    71 %  EF-Biplane:     69 %  EF-Visual:      68 %  LV EF Reported: 68 %       LV DIASTOLIC FUNCTION:             Normal Ranges:  MV Peak E:             0.80 m/s    (0.7-1.2 m/s)  MV Peak A:             0.87 m/s    (0.42-0.7 m/s)  E/A Ratio:             0.92        (1.0-2.2)  MV e'                  0.065 m/s   (>8.0)  MV lateral e'          0.07 m/s  MV medial e'           0.06 m/s  MV A Dur:              137.01 msec  E/e' Ratio:            12.30       (<8.0)  MV DT:                 263 msec    (150-240 msec)       AORTIC VALVE:                      Normal Ranges:  AoV Vmax:                2.09 m/s  (<=1.7m/s)  AoV Peak P.4 mmHg (<20mmHg)  AoV Mean P.3 mmHg  (1.7-11.5mmHg)  LVOT Max Tony:            1.15 m/s  (<=1.1m/s)  AoV VTI:                 42.81 cm  (18-25cm)  LVOT VTI:                25.64 cm  LVOT Diameter:           1.80 cm   (1.8-2.4cm)  AoV Area, VTI:           1.52 cm2  (2.5-5.5cm2)  AoV Area,Vmax:           1.40 cm2  (2.5-4.5cm2)  AoV Dimensionless Index: 0.60       RIGHT VENTRICLE:  RV Basal 2.66 cm  RV Mid   2.10 cm  RV Major 5.7 cm  RV s'    0.12 m/s       TRICUSPID VALVE/RVSP:          Normal Ranges:  Peak TR Velocity:     3.38 m/s  RV Syst Pressure:     61 mmHg  (< 30mmHg)       PULMONIC VALVE:          Normal Ranges:  PV Max Tony:     0.6 m/s  (0.6-0.9m/s)  PV Max P.5 mmHg       [id number] Wali Rogers MD  Electronically signed on 2024 at 12:01:41 PM       ** Final **        Assessment/Plan     This very pleasant 73-year-old female is being seen in perioperative cardiac risk evaluation in anticipation of a right total shoulder replacement.  She developed a shoulder injury after a mechanical fall    Also diagnosed recently with atrial fibrillation and RVR.  Currently on  metoprolol 25 twice daily, Eliquis 2.5 twice daily with persistent intermittent atrial fibrillation.  Echo with preserved EF moderate pulmonary hypertension no other structural cardiac abnormalities.  Cannot assess functional capacity she is largely wheelchair-bound due to prior surgeries on her back head and shoulder.  Her patient's specific risk is moderately elevated, surgery specific risk is moderately elevated.  I would recommend continuing her on her beta-blocker through the surgery.  I do think she is fairly well optimized and I do not see any further cardiac's testing being indicated.  After her procedure I would like to see her in follow-up at that point we will increase her Eliquis to 5 twice daily and likely do a cardiac monitor.  Thank you for allowing me to participate in your patient's care  Orders:  No orders of the defined types were placed in this encounter.  After the patient was seen I went back and reviewed the echo, her overall LV size and function were normal, RV size and function were normal as well, the TR envelope was not optimal.  I am not sure it adequately represents what her true RV systolic pressure is.  I do not think the estimated RVSP on her echo should have any adverse effect on her operative risk which I believe is acceptable  Followup Appts:  Future Appointments   Date Time Provider Department Center   9/30/2024  9:40 AM Curtis Rajan MD [id number] [facility]   10/11/2024  2:00 PM Matthew Elmore MD [id number] [facility]   10/21/2024  1:15 PM Blu Bashir MD MPH [id number] [facility]   10/29/2024  3:00 PM Mely Brar MD [id number] [facility]           ____________________________________________________________  Curtis Rajan MD    Senior Attending Physician  [facility]  [facility]

## 2024-09-30 NOTE — PATIENT INSTRUCTIONS
Thank you for coming to your cardiology visit today.  You have a diagnosis of paroxysmal atrial fibrillation, normal heart systolic function with moderate pulmonary hypertension based on your recent echocardiogram.  Due to the fact that you are largely wheelchair-bound you have a somewhat increased risk for any operative procedure due to deconditioning.  However I think you are fairly stable from a cardiac standpoint and for that reason I would estimate your cardiovascular risk for your shoulder surgery as acceptable.  Please continue your metoprolol through the surgery.  Please resume Eliquis at a higher dose of a 5 mg when cleared from a surgical perspective.  Please make a follow-up appointment to see me in 6 weeks at which time we will do a cardiac monitor.  Also please make sure you get your follow-up blood tests ordered by your PCP before that visit.  My 's number is [phone] and call me with any questions

## 2024-10-08 DIAGNOSIS — R93.1 ABNORMAL ECHOCARDIOGRAM: Primary | ICD-10-CM

## 2024-10-11 ENCOUNTER — APPOINTMENT (OUTPATIENT)
Dept: CARDIOLOGY | Facility: HOSPITAL | Age: 74
End: 2024-10-11
Payer: COMMERCIAL

## 2024-10-21 ENCOUNTER — APPOINTMENT (OUTPATIENT)
Dept: PULMONOLOGY | Facility: CLINIC | Age: 74
End: 2024-10-21
Payer: COMMERCIAL

## 2024-10-29 ENCOUNTER — APPOINTMENT (OUTPATIENT)
Dept: PRIMARY CARE | Facility: CLINIC | Age: 74
End: 2024-10-29
Payer: COMMERCIAL

## 2024-11-07 ENCOUNTER — DOCUMENTATION (OUTPATIENT)
Dept: HOME HEALTH SERVICES | Facility: HOME HEALTH | Age: 74
End: 2024-11-07
Payer: COMMERCIAL

## 2024-11-07 ENCOUNTER — HOME HEALTH ADMISSION (OUTPATIENT)
Dept: HOME HEALTH SERVICES | Facility: HOME HEALTH | Age: 74
End: 2024-11-07
Payer: COMMERCIAL

## 2024-11-07 NOTE — HH CARE COORDINATION
Home Care received a Referral for Physical Therapy. We have processed the referral for a Start of Care on 24-48 HOURS .     If you have any questions or concerns, please feel free to contact us at [phone]. Follow the prompts, enter your five digit zip code, and you will be directed to your care team on [facility].

## 2024-11-11 ENCOUNTER — APPOINTMENT (OUTPATIENT)
Dept: PRIMARY CARE | Facility: CLINIC | Age: 74
End: 2024-11-11
Payer: COMMERCIAL

## 2024-11-15 ENCOUNTER — APPOINTMENT (OUTPATIENT)
Dept: CARDIOLOGY | Facility: CLINIC | Age: 74
End: 2024-11-15
Payer: COMMERCIAL

## 2024-11-15 ENCOUNTER — HOME CARE VISIT (OUTPATIENT)
Dept: HOME HEALTH SERVICES | Facility: HOME HEALTH | Age: 74
End: 2024-11-15
Payer: COMMERCIAL

## 2024-11-15 VITALS
HEART RATE: 72 BPM | WEIGHT: 145 LBS | TEMPERATURE: 98.6 F | DIASTOLIC BLOOD PRESSURE: 60 MMHG | BODY MASS INDEX: 24.16 KG/M2 | SYSTOLIC BLOOD PRESSURE: 104 MMHG | RESPIRATION RATE: 14 BRPM | HEIGHT: 65 IN

## 2024-11-15 PROCEDURE — G0151 HHCP-SERV OF PT,EA 15 MIN: HCPCS

## 2024-11-15 PROCEDURE — 169592 NO-PAY CLAIM PROCEDURE

## 2024-11-15 SDOH — HEALTH STABILITY: PHYSICAL HEALTH: PHYSICAL EXERCISE: SIT, SUPINE

## 2024-11-15 SDOH — HEALTH STABILITY: PHYSICAL HEALTH: EXERCISE TYPE: B LE

## 2024-11-15 SDOH — HEALTH STABILITY: PHYSICAL HEALTH: EXERCISE ACTIVITIES SETS: 2

## 2024-11-15 SDOH — HEALTH STABILITY: PHYSICAL HEALTH: EXERCISE ACTIVITY: SAQ, SLR, HS, QS, TKE HIP FLEX

## 2024-11-15 SDOH — HEALTH STABILITY: PHYSICAL HEALTH: PHYSICAL EXERCISE: 15

## 2024-11-15 ASSESSMENT — ACTIVITIES OF DAILY LIVING (ADL)
AMBULATION ASSISTANCE ON FLAT SURFACES: 1
OASIS_M1830: 03
PHYSICAL TRANSFERS ASSESSED: 1
ENTERING_EXITING_HOME: MODERATE ASSIST
CURRENT_FUNCTION: MINIMUM ASSIST
AMBULATION_DISTANCE/DURATION_TOLERATED: 3 FT

## 2024-11-15 ASSESSMENT — ENCOUNTER SYMPTOMS
PAIN LOCATION - PAIN FREQUENCY: FREQUENT
PAIN SEVERITY GOAL: 0/10
SUBJECTIVE PAIN PROGRESSION: WAXING AND WANING
MUSCLE WEAKNESS: 1
PAIN: 1
PAIN LOCATION - EXACERBATING FACTORS: ROM
PAIN LOCATION - RELIEVING FACTORS: REST
HYPERTENSION: 1
PAIN LOCATION - PAIN DURATION: HR
PAIN LOCATION - PAIN SEVERITY: 4/10
LOWEST PAIN SEVERITY IN PAST 24 HOURS: 1/10
HIGHEST PAIN SEVERITY IN PAST 24 HOURS: 4/10
PERSON REPORTING PAIN: PATIENT
PAIN LOCATION: RIGHT ANKLE
PAIN LOCATION - PAIN QUALITY: ACHE
LIMITED RANGE OF MOTION: 1

## 2024-11-18 ENCOUNTER — HOME CARE VISIT (OUTPATIENT)
Dept: HOME HEALTH SERVICES | Facility: HOME HEALTH | Age: 74
End: 2024-11-18
Payer: COMMERCIAL

## 2024-11-20 ENCOUNTER — HOME CARE VISIT (OUTPATIENT)
Dept: HOME HEALTH SERVICES | Facility: HOME HEALTH | Age: 74
End: 2024-11-20
Payer: COMMERCIAL

## 2024-11-20 PROCEDURE — G0152 HHCP-SERV OF OT,EA 15 MIN: HCPCS

## 2024-11-20 ASSESSMENT — ENCOUNTER SYMPTOMS
HIGHEST PAIN SEVERITY IN PAST 24 HOURS: 5/10
PERSON REPORTING PAIN: PATIENT
PAIN LOCATION: RIGHT SHOULDER
PAIN: 1

## 2024-11-20 ASSESSMENT — ACTIVITIES OF DAILY LIVING (ADL)
TOILETING: MODERATE ASSIST
DRESSING_UB_CURRENT_FUNCTION: MODERATE ASSIST
BATHING ASSESSED: 1
DRESSING_LB_CURRENT_FUNCTION: MODERATE ASSIST
BATHING_CURRENT_FUNCTION: MODERATE ASSIST
TOILETING: 1

## 2024-11-21 ENCOUNTER — HOSPITAL ENCOUNTER (OUTPATIENT)
Dept: RADIOLOGY | Facility: HOSPITAL | Age: 74
Discharge: HOME | End: 2024-11-21
Payer: COMMERCIAL

## 2024-11-21 ENCOUNTER — HOME CARE VISIT (OUTPATIENT)
Dept: HOME HEALTH SERVICES | Facility: HOME HEALTH | Age: 74
End: 2024-11-21
Payer: COMMERCIAL
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
## 2024-11-21 DIAGNOSIS — M25.571 RIGHT ANKLE PAIN, UNSPECIFIED CHRONICITY: ICD-10-CM

## 2024-11-21 DIAGNOSIS — M25.571 RIGHT ANKLE PAIN, UNSPECIFIED CHRONICITY: Primary | ICD-10-CM

## 2024-11-21 PROCEDURE — 73610 X-RAY EXAM OF ANKLE: CPT | Mod: RT

## 2024-11-21 PROCEDURE — 73610 X-RAY EXAM OF ANKLE: CPT | Mod: RIGHT SIDE | Performed by: RADIOLOGY

## 2024-11-23 ENCOUNTER — APPOINTMENT (OUTPATIENT)
Dept: RADIOLOGY | Facility: HOSPITAL | Age: 74
End: 2024-11-23
Payer: COMMERCIAL

## 2024-11-23 ENCOUNTER — HOSPITAL ENCOUNTER (EMERGENCY)
Facility: HOSPITAL | Age: 74
Discharge: HOME | End: 2024-11-23
Attending: GENERAL PRACTICE
Payer: COMMERCIAL

## 2024-11-23 VITALS
RESPIRATION RATE: 18 BRPM | WEIGHT: 145 LBS | SYSTOLIC BLOOD PRESSURE: 146 MMHG | HEART RATE: 80 BPM | TEMPERATURE: 98.2 F | BODY MASS INDEX: 24.13 KG/M2 | OXYGEN SATURATION: 95 % | DIASTOLIC BLOOD PRESSURE: 71 MMHG

## 2024-11-23 DIAGNOSIS — S82.851A CLOSED TRIMALLEOLAR FRACTURE OF RIGHT ANKLE, INITIAL ENCOUNTER: Primary | ICD-10-CM

## 2024-11-23 PROCEDURE — 73590 X-RAY EXAM OF LOWER LEG: CPT | Mod: RIGHT SIDE | Performed by: RADIOLOGY

## 2024-11-23 PROCEDURE — 73630 X-RAY EXAM OF FOOT: CPT | Mod: RT

## 2024-11-23 PROCEDURE — 2500000001 HC RX 250 WO HCPCS SELF ADMINISTERED DRUGS (ALT 637 FOR MEDICARE OP): Performed by: GENERAL PRACTICE

## 2024-11-23 PROCEDURE — 73590 X-RAY EXAM OF LOWER LEG: CPT | Mod: RT

## 2024-11-23 PROCEDURE — 73610 X-RAY EXAM OF ANKLE: CPT | Mod: RIGHT SIDE | Performed by: RADIOLOGY

## 2024-11-23 PROCEDURE — 73700 CT LOWER EXTREMITY W/O DYE: CPT | Mod: RIGHT SIDE | Performed by: RADIOLOGY

## 2024-11-23 PROCEDURE — 99284 EMERGENCY DEPT VISIT MOD MDM: CPT | Mod: 25

## 2024-11-23 PROCEDURE — 29515 APPLICATION SHORT LEG SPLINT: CPT | Mod: RT

## 2024-11-23 PROCEDURE — 73630 X-RAY EXAM OF FOOT: CPT | Mod: RIGHT SIDE | Performed by: RADIOLOGY

## 2024-11-23 PROCEDURE — 73700 CT LOWER EXTREMITY W/O DYE: CPT | Mod: RT

## 2024-11-23 PROCEDURE — 73610 X-RAY EXAM OF ANKLE: CPT | Mod: RT

## 2024-11-23 RX ORDER — OXYCODONE AND ACETAMINOPHEN 5; 325 MG/1; MG/1
1 TABLET ORAL EVERY 6 HOURS PRN
Qty: 12 TABLET | Refills: 0 | Status: SHIPPED | OUTPATIENT
Start: 2024-11-23 | End: 2024-11-26

## 2024-11-23 RX ORDER — OXYCODONE AND ACETAMINOPHEN 5; 325 MG/1; MG/1
1 TABLET ORAL ONCE
Status: COMPLETED | OUTPATIENT
Start: 2024-11-23 | End: 2024-11-23

## 2024-11-23 RX ADMIN — OXYCODONE HYDROCHLORIDE AND ACETAMINOPHEN 1 TABLET: 5; 325 TABLET ORAL at 13:21

## 2024-11-23 ASSESSMENT — PAIN SCALES - GENERAL: PAINLEVEL_OUTOF10: 9

## 2024-11-23 ASSESSMENT — COLUMBIA-SUICIDE SEVERITY RATING SCALE - C-SSRS
1. IN THE PAST MONTH, HAVE YOU WISHED YOU WERE DEAD OR WISHED YOU COULD GO TO SLEEP AND NOT WAKE UP?: NO
2. HAVE YOU ACTUALLY HAD ANY THOUGHTS OF KILLING YOURSELF?: NO
6. HAVE YOU EVER DONE ANYTHING, STARTED TO DO ANYTHING, OR PREPARED TO DO ANYTHING TO END YOUR LIFE?: NO

## 2024-11-23 ASSESSMENT — PAIN DESCRIPTION - DESCRIPTORS: DESCRIPTORS: THROBBING;BURNING;ACHING

## 2024-11-23 ASSESSMENT — PAIN DESCRIPTION - FREQUENCY: FREQUENCY: CONSTANT/CONTINUOUS

## 2024-11-23 ASSESSMENT — PAIN DESCRIPTION - PAIN TYPE: TYPE: ACUTE PAIN

## 2024-11-23 ASSESSMENT — PAIN DESCRIPTION - LOCATION: LOCATION: ANKLE

## 2024-11-23 ASSESSMENT — PAIN DESCRIPTION - ONSET: ONSET: ONGOING

## 2024-11-23 ASSESSMENT — PAIN - FUNCTIONAL ASSESSMENT: PAIN_FUNCTIONAL_ASSESSMENT: 0-10

## 2024-11-23 ASSESSMENT — PAIN DESCRIPTION - ORIENTATION: ORIENTATION: RIGHT

## 2024-11-25 ENCOUNTER — HOME CARE VISIT (OUTPATIENT)
Dept: HOME HEALTH SERVICES | Facility: HOME HEALTH | Age: 74
End: 2024-11-25
Payer: COMMERCIAL

## 2024-11-26 ENCOUNTER — HOME CARE VISIT (OUTPATIENT)
Dept: HOME HEALTH SERVICES | Facility: HOME HEALTH | Age: 74
End: 2024-11-26
Payer: COMMERCIAL

## 2024-11-27 NOTE — ED PROVIDER NOTES
HPI   Chief Complaint   Patient presents with    Ankle Pain       HPI: 74-year-old female with a history of hypertension presents for evaluation of a trimalleolar fracture on the right.  She states that 8 days ago she slipped on a piece of plastic in her kitchen and inverted her ankle.  Since then she has had pain, swelling and ecchymoses in the distal right lower extremity.  She has been using a wheelchair at home.  She underwent an outpatient x-ray today which showed a trimalleolar fracture.  She denies any other injury      Limitations to history: None  Independent Historians: Patient  External Records Reviewed: HIE, outpatient notes, inpatient notes  ------------------------------------------------------------------------------------------------------------------------------------------  ROS: a ten point review of systems was performed and was negative except as per HPI.  ------------------------------------------------------------------------------------------------------------------------------------------  PMH / PSH: as per HPI, otherwise reviewed in EMR  MEDS: as per HPI, otherwise reviewed in EMR  ALLERGIES: as per HPI, otherwise reviewed in EMR  SocH:  as per HPI, otherwise reviewed in EMR  FH:  as per HPI, otherwise reviewed in EMR  ------------------------------------------------------------------------------------------------------------------------------------------  Physical Exam:  VS: As documented in the triage note and EMR flowsheet from this visit was reviewed  General: Well appearing. No acute distress.   Eyes:  Extraocular movements grossly intact. No scleral icterus. No discharge  HEENT:  Normocephalic.  Atraumatic  Neck: Moves neck freely. No gross masses  CV: Regular rhythm. No murmurs, rubs or gallops   Resp: Clear to auscultation bilaterally. No respiratory distress.    GI: Soft, no masses, nontender. No rebound tenderness or guarding  MSK: Symmetric muscle bulk.  There is swelling and  ecchymoses of the right ankle and distal right lower extremity.  There is bony tenderness over the lateral and medial malleoli.    Skin: Warm, dry, intact.   Neuro: No focal deficits.  A&O x3.   Psych: Appropriate for situation  ------------------------------------------------------------------------------------------------------------------------------------------  Hospital Course / Medical Decision Making:  Independent Interpretations: Xray right ankle, foot, tib/fib, CT ankle  EKG as interpreted by me: NA    MDM: 74-year-old female with a history of hypertension presents for evaluation of a trimalleolar fracture on the right.  This was confirmed with x-rays in the ED before and after splinting.  A splint was applied by the medic without difficulty and my post splint check shows that the patient is neurovascularly intact.  A CT was obtained of the ankle after splinting at the request of orthopedics for preoperative planning.  I did provide the patient with a prescription for several Percocet tablets and instructed her to not drive or operate machinery after taking the medication.  She will follow-up with orthopedics as soon as possible and will return to the ER for any concerns    Discussion of Management with Other Providers:   I discussed the patient/results with: Emergency medicine team    Final diagnosis and disposition as below.                 CT ankle right wo IV contrast   Final Result    1.  Acute mildly displaced fracture involving the right anterior    medial malleolus with extension to the tibiotalar joint.    2.  Small acute displaced fracture involving the lateral distal aspect    of the right tibia with extension to the tibiotalar joint.    3.  Tiny avulsion fracture involving the posterior and distal aspect    of the right tibia.    4.  Acute comminuted and mildly impacted fracture involving the distal    metadiaphysis of the right fibula with mild medial and anterior apical    angulation.     Signed by Tank Stauffer MD     XR ankle right 3+ views   Final Result    No significant change in the alignment of the trimalleolar fracture    following reduction and splinting.    Signed by Rubio Garcia MD     XR tibia fibula right 2 views   Final Result    Slightly displaced trimalleolar fractures of the distal tibia and    fibula.    Signed by Rubio Garcia MD     XR foot right 3+ views   Final Result    No fractures or dislocations in the foot.    Signed by Rubio Garcia MD                   Patient History   Past Medical History:   Diagnosis Date    Acute left ankle pain 01/08/2024    Acute pain due to trauma 01/08/2024    Biceps tendinitis of right upper extremity 03/29/2024    Complication of surgical procedure 04/29/2024    Contracture of left shoulder 08/07/2020    History of hypertension 06/10/2024    History of infectious disease 06/10/2024    History of subdural hematoma 08/07/2020    Last Assessment & Plan: Formatting of this note might be different from the original. Assessment: H/o subdural hematoma after falling out of bed s/p surgery with subsequent procedure for staph infection - on doxy for hx of staph infection Residual deficits include loss of sensation of right hand and proprioception issue of right hand    Injury of tendon of long head of biceps, left, initial encounter 08/07/2020    Joint stiffness 04/29/2024    Low back pain 04/29/2024    Muscle weakness 04/29/2024    Pain in left shoulder 08/20/2020    Palpitations 06/10/2024    Personal history of other diseases of the circulatory system 01/14/2014    History of hypertension    Personal history of other diseases of the circulatory system 07/01/2020    History of subdural hematoma    Personal history of other diseases of the musculoskeletal system and connective tissue 06/26/2018    History of low back pain    Personal history of other infectious and parasitic diseases 07/01/2020    History of staphylococcal infection    Personal  history of other specified conditions 06/26/2018    History of insomnia    Personal history of pneumonia (recurrent) 07/17/2018    History of pneumonia    Unspecified asthma, uncomplicated ([facility]) 03/19/2017    Asthma    Urinary tract infection 06/10/2024    Weakness 06/10/2024     Past Surgical History:   Procedure Laterality Date    BACK SURGERY  01/14/2014    Back Surgery    CT HEAD ANGIO W AND WO IV CONTRAST  8/18/2019    CT HEAD ANGIO W AND WO IV CONTRAST 8/18/2019 [facility] CLINICAL LEGACY    MR HEAD ANGIO WO IV CONTRAST  8/2/2019    MR HEAD ANGIO WO IV CONTRAST 8/2/2019 [facility] CLINICAL LEGACY    MR NECK ANGIO WO IV CONTRAST  8/2/2019    MR NECK ANGIO WO IV CONTRAST 8/2/2019 [facility] CLINICAL LEGACY    NECK SURGERY  01/14/2014    Neck Surgery    OTHER SURGICAL HISTORY  01/14/2014    General Surgery    OTHER SURGICAL HISTORY  07/01/2020    Brain surgery    OTHER SURGICAL HISTORY  07/15/2022    Ectopic pregnancy removal with salpingectomy     No family history on file.  Social History     Tobacco Use    Smoking status: Former     Types: Cigarettes    Smokeless tobacco: Never   Substance Use Topics    Alcohol use: Not Currently    Drug use: Never       Physical Exam   ED Triage Vitals   Temperature Heart Rate Respirations BP   11/23/24 1124 11/23/24 1124 11/23/24 1124 11/23/24 1124   36.8 °C (98.2 °F) (!) 103 18 119/78      Pulse Ox Temp Source Heart Rate Source Patient Position   11/23/24 1124 11/23/24 1124 11/23/24 1507 11/23/24 1124   97 % Oral Monitor Sitting      BP Location FiO2 (%)     11/23/24 1124 --     Left arm        Physical Exam      ED Course & MDM   ED Course as of 11/27/24 1711   Sat Nov 23, 2024   1506 CT ankle right wo IV contrast [KS]      ED Course User Index  [KS] Wolfgang Polanco MD MPH         Diagnoses as of 11/27/24 1711   Closed trimalleolar fracture of right ankle, initial encounter                 No data recorded     [place] Coma Scale Score: 15 (11/23/24 1124 : Slick Andres RN)                            Medical Decision Making      Procedure  Procedures     Stuart Lora,   11/27/24 2173

## 2024-12-02 ENCOUNTER — OFFICE VISIT (OUTPATIENT)
Dept: ORTHOPEDIC SURGERY | Facility: HOSPITAL | Age: 74
End: 2024-12-02
Payer: COMMERCIAL

## 2024-12-02 DIAGNOSIS — M94.9 CHONDRAL LESION: ICD-10-CM

## 2024-12-02 DIAGNOSIS — S93.431A SYNDESMOTIC DISRUPTION OF ANKLE, RIGHT, INITIAL ENCOUNTER: ICD-10-CM

## 2024-12-02 DIAGNOSIS — S82.851A CLOSED TRIMALLEOLAR FRACTURE OF RIGHT ANKLE, INITIAL ENCOUNTER: Primary | ICD-10-CM

## 2024-12-02 DIAGNOSIS — R00.2 PALPITATIONS WITH REGULAR CARDIAC RHYTHM: ICD-10-CM

## 2024-12-02 PROCEDURE — 99204 OFFICE O/P NEW MOD 45 MIN: CPT | Performed by: ORTHOPAEDIC SURGERY

## 2024-12-02 PROCEDURE — 99214 OFFICE O/P EST MOD 30 MIN: CPT | Performed by: ORTHOPAEDIC SURGERY

## 2024-12-02 PROCEDURE — 1159F MED LIST DOCD IN RCRD: CPT | Performed by: ORTHOPAEDIC SURGERY

## 2024-12-02 RX ORDER — OXYCODONE AND ACETAMINOPHEN 5; 325 MG/1; MG/1
1 TABLET ORAL EVERY 8 HOURS PRN
Qty: 21 TABLET | Refills: 0 | Status: SHIPPED | OUTPATIENT
Start: 2024-12-02 | End: 2024-12-09

## 2024-12-02 NOTE — PROGRESS NOTES
NPV-   History of Present Illness  74 y.o.female here for right ankle pain  1. Closed trimalleolar fracture of right ankle, initial encounter          Mechanism of injury: fall  Date of Injury/Pain: 11/15/24  Location of pain: ankle  Frequency of Pain: worse with walking or standing  Associated symptoms? Swelling, bruising  Previous treatment?  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   NWB in wheelchair    Past Medical History:   Diagnosis Date    Acute left ankle pain 01/08/2024    Acute pain due to trauma 01/08/2024    Biceps tendinitis of right upper extremity 03/29/2024    Complication of surgical procedure 04/29/2024    Contracture of left shoulder 08/07/2020    History of hypertension 06/10/2024    History of infectious disease 06/10/2024    History of subdural hematoma 08/07/2020    Last Assessment & Plan: Formatting of this note might be different from the original. Assessment: H/o subdural hematoma after falling out of bed s/p surgery with subsequent procedure for staph infection - on doxy for hx of staph infection Residual deficits include loss of sensation of right hand and proprioception issue of right hand    Injury of tendon of long head of biceps, left, initial encounter 08/07/2020    Joint stiffness 04/29/2024    Low back pain 04/29/2024    Muscle weakness 04/29/2024    Pain in left shoulder 08/20/2020    Palpitations 06/10/2024    Personal history of other diseases of the circulatory system 01/14/2014    History of hypertension    Personal history of other diseases of the circulatory system 07/01/2020    History of subdural hematoma    Personal history of other diseases of the musculoskeletal system and connective tissue 06/26/2018    History of low back pain    Personal history of other infectious and parasitic diseases 07/01/2020    History of staphylococcal infection    Personal history  of other specified conditions 06/26/2018    History of insomnia    Personal history of pneumonia (recurrent) 07/17/2018    History of pneumonia    Unspecified asthma, uncomplicated ([facility]) 03/19/2017    Asthma    Urinary tract infection 06/10/2024    Weakness 06/10/2024        Allergies   Allergen Reactions    Allerg Xt,D.Farinae-D.Pteronys Unknown     Other Reaction(s): Other: See Comments      Sneezing    Cat Hair Standardized Allergenic Extract Unknown     Other Reaction(s): Other: See Comments      sneezing        Past Surgical History:   Procedure Laterality Date    BACK SURGERY  01/14/2014    Back Surgery    CT HEAD ANGIO W AND WO IV CONTRAST  8/18/2019    CT HEAD ANGIO W AND WO IV CONTRAST 8/18/2019 [facility] CLINICAL LEGACY    MR HEAD ANGIO WO IV CONTRAST  8/2/2019    MR HEAD ANGIO WO IV CONTRAST 8/2/2019 [facility] CLINICAL LEGACY    MR NECK ANGIO WO IV CONTRAST  8/2/2019    MR NECK ANGIO WO IV CONTRAST 8/2/2019 [facility] CLINICAL LEGACY    NECK SURGERY  01/14/2014    Neck Surgery    OTHER SURGICAL HISTORY  01/14/2014    General Surgery    OTHER SURGICAL HISTORY  07/01/2020    Brain surgery    OTHER SURGICAL HISTORY  07/15/2022    Ectopic pregnancy removal with salpingectomy        No family history on file.     Social History     Socioeconomic History    Marital status:      Spouse name: Not on file    Number of children: Not on file    Years of education: Not on file    Highest education level: Not on file   Occupational History    Not on file   Tobacco Use    Smoking status: Former     Types: Cigarettes    Smokeless tobacco: Never   Substance and Sexual Activity    Alcohol use: Not Currently    Drug use: Never    Sexual activity: Not on file   Other Topics Concern    Not on file   Social History Narrative    Not on file     Social Drivers of Health     Financial Resource Strain: Not on file   Food Insecurity: No Food Insecurity (8/12/2024)    Received from [facility]    Hunger Vital Sign     Worried About  Running Out of Food in the Last Year: Never true     Ran Out of Food in the Last Year: Never true   Transportation Needs: No Transportation Needs (11/15/2024)    OASIS : Transportation     Lack of Transportation (Medical): No     Lack of Transportation (Non-Medical): No     Patient Unable or Declines to Respond: No   Physical Activity: Not on file   Stress: Not on file   Social Connections: Feeling Socially Integrated (11/15/2024)    OASIS : Social Isolation     Frequency of experiencing loneliness or isolation: Never   Intimate Partner Violence: Not on file   Housing Stability: Not on file        CURRENT MEDICATIONS:   Current Outpatient Medications   Medication Sig Dispense Refill    albuterol 90 mcg/actuation inhaler Inhale 2 puffs every 4 hours if needed for shortness of breath or wheezing. 18 g 2    alendronate (Fosamax) 70 mg tablet Take 1 tablet (70 mg) by mouth every 7 days. Take in the morning with a full glass of water, on an empty stomach, and do not take anything else by mouth or lie down for the next 30 min. (Patient not taking: Reported on 9/9/2024) 12 tablet 1    apixaban (Eliquis) 5 mg tablet Take 1 tablet (5 mg) by mouth 2 times a day. 180 tablet 3    atorvastatin (Lipitor) 20 mg tablet TAKE ONE TABLET BY MOUTH EVERY DAY 90 tablet 0    cholecalciferol (Vitamin D-3) 25 MCG (1000 UT) tablet Take 1 tablet (25 mcg) by mouth once daily.      cycloSPORINE (Restasis) 0.05 % ophthalmic emulsion Administer 1 drop into both eyes if needed.      doxycycline (Vibramycin) 100 mg capsule TAKE ONE CAPSULE BY MOUTH EVERY 12 HOURS 180 capsule 3    finasteride (Proscar) 5 mg tablet Take 1 tablet (5 mg) by mouth once daily.      fluticasone propion-salmeteroL (AirDuo RespiClick) 55-14 mcg/actuation inhaler Inhale 1 puff 2 times a day.      gabapentin (Neurontin) 400 mg capsule Take 1 capsule (400 mg) by mouth 4 times a day.      metoprolol tartrate (Lopressor) 25 mg tablet Take 1 tablet (25 mg) by mouth 2  times a day. 180 tablet 3    minoxidil (Loniten) 2.5 mg tablet Take 1 tablet (2.5 mg) by mouth once daily.      nystatin (Mycostatin) cream Apply topically if needed. Apply to affected areas 2-3 times daily      olmesartan (BENIcar) 20 mg tablet TAKE ONE TABLET BY MOUTH EVERY DAY 90 tablet 1    tiZANidine (Zanaflex) 2 mg tablet Take by mouth 4 times a day as needed.      traMADol (Ultram) 50 mg tablet Take 1 tablet (50 mg) by mouth every 4 hours.      traZODone (Desyrel) 50 mg tablet Take 1 tablet (50 mg) by mouth once daily at bedtime.       Current Facility-Administered Medications   Medication Dose Route Frequency Provider Last Rate Last Admin    perflutren lipid microspheres (Definity) injection 10 mL of dilution  10 mL of dilution intravenous Once Curtis Rajan MD          REVIEW OF SYSTEMS  27 point review of systems negative except what is stated in HPI    PHYSICAL EXAM  Constitutional Exam: patient's height and weight reviewed, well-kempt  Psychiatric Exam: alert and oriented x 3, appropriate mood and behavior  Eye Exam: KSENIA, EOMI  Pulmonary Exam: breathing non-labored, no apparent distress  Lymphatic exam: no appreciable lymphadenopathy in the lower extremities  Cardiovascular exam: DP pulses 2+ bilaterally, PT 2+ bilaterally, toes are pink with good capillary refill, no pitting edema  Skin exam: no open lesions, rashes, abrasions or ulcerations  Neurological exam: sensation to light touch intact in both lower extremities in peripheral and dermatomal distributions (except for any abnormalities noted in musculoskeletal exam)  Musculoskeletal exam:    On examination of the right ankle/foot:  NWB  Mild swelling of the ankle with ecchymosis. no erythema  Decreased ROM in plantarflexion, dorsiflexion, inversion and eversion  Decreased strength in plantarflexion, dorsiflexion, inversion and eversion.  Tenderness to palpation: distal fibula and medial malleolus  No tenderness to palpation over the Achilles,  "posterior tibial tendon, peroneal tendon, base of fifth metatarsal, deltoid ligament, talus or navicular.  Neurovascularly intact.  No sensory deficit to light touch.  Dorsalis pedis and posterior tibial pulses 2+ bilaterally.  Negative proprioception testing.   - Proctor's test                                 IMAGING/DATA REVIEW  I personally reviewed the patient's x-ray images and reports of the right ankle. The xrays show a displaced trimalleolar fracture with anterolateral distal tibial chaput fragment and likely syndesmotic disruption also due to widening. no other fractures.     ASSESSMENT: right ankle trimalleolar fracture with chaput fragment and syndesmotic disruption, hx of A-Fib on eliquis followed by cardiology with Dr. Shay    PLAN: Discussed with the patient the complex traumatic related nature of her condition and options for treatment.  The benefits and risks of surgery were discussed with the patient including bleeding, infection and deformity. They understood these risks. They will have  right ankle trimalleolar fracture ORIF. They will call the office to schedule their surgery. She will need to be off Eliquis 5 days prior to surgery. Patient was placed in a well padded fiberglass cast/splint to be nonweightbearing with crutches. They will keep the cast/splint elevated supported at the calf with NO pressure on the heel to reduce swelling.    Today, We spoke with Emy Potts's cardiologist Dr. Curtis Rajan who stated and chat texted that patient's \"cardiovascular risk for surgery as being mildly elevated due to her multiple comorbidities but  operative risk was acceptable. Her Eliquis can be held for 5 days if needed\"    Due to the unstable nature of her right trimalleolar ankle fracture with syndesmotic disruption, we recommend surgical management of her condition.  Conservative treatment would not yield good results as it would be very difficult to hold the ankle in anatomical alignment with " casting treatment.  Discussed with patient advantages and disadvantages of surgical versus conservative management.  Surgery would involve ankle arthroscopy with extensive debridement followed by open reduction internal fixation of trimalleolar ankle fracture with reduction of the syndesmosis and possible screw fixation of Chaput fragment.  She would have to be nonweightbearing for 6 to 8 weeks after surgery first in a splint placed postoperatively followed by cast 2 weeks later and progression into boot once appropriate soft tissue and bony healing present.  Patient has right upper extremity neurological injury and deformity and so she has to use a wheelchair.  Discussed with her the option of skilled nursing facility or short-term rehab now or after surgery.  The patient does not want to go to a skilled nursing facility now or after surgery.  She feels that her and her  are doing well at home and she will not go to rehabilitation facility based on her prior poor experience.  We offered home health referral but patient wanted to hold off at this time since she already has an occupational therapist coming to her house to work on her right upper extremity.  She states that she will let us know if she would like to have home health referral services with physical therapy and personal aide and other support.  Discussed with her that early cover from surgery is 3 to 4 months.  No driving for at least 10 weeks after surgery.  Physical therapy exercises will commence once out of the cast.  Risks of surgery including pain, bleeding, infection, wound healing problems, soft tissue healing problems, bone healing problems, hardware related complications, post-traumatic arthritis of the ankle requiring further treatment in the future, injury to nerves or vessels, DVT PE and other medical complications were discussed.  She will restart her Eliquis the day after surgery.  We discussed with her cardiologist's  recommendations as stated above.  She is not able to use a knee walker.  We did discuss the option of a transport wheelchair which is not as wide which may help her in the bathroom.  Or the other option would be to transfer out of the wheelchair into a shower chair which is a similar height as the elevated toilet seat in her bathroom.  We placed her in a well-padded sugar-tong and posterior splint.  She was instructed to elevate the leg to decrease the swelling is much as possible so that the soft tissues will be ready for surgical incision.  Outpatient procedure done under general anesthetic with regional block and multimodal pain management protocol.      All the patient's questions were answered. The patient agrees with the above plan.  Follow up in surgery

## 2024-12-02 NOTE — PATIENT INSTRUCTIONS
You were placed in a cast/splint to be nonweightbearing with your crutches. DO NOT place any weight on your cast/splint. It is important to keep your cast/splint elevated supported at the calf with NO pressure on the heel to reduce swelling.    We have discussed your treatment options, risks and benefits and surgery for your condition was selected and recommended.  Additional laboratory studies, EKG and chest x-ray may need to be performed prior to surgery, particularly if you have chronic health problems.  We will give you an order to get further testing done if needed.  You may have to see your primary care doctor or the [facility] to be medically cleared (healthy enough to safely undergo surgery without further treatment for your medical conditions) prior to your surgery.  Please do not eat, drink, or chew anything after midnight the night before your surgery.  Please call Maribel Vogt at [phone] to schedule your surgery and if you have any further questions.    Follow up with cardiology

## 2024-12-03 ENCOUNTER — HOME CARE VISIT (OUTPATIENT)
Dept: HOME HEALTH SERVICES | Facility: HOME HEALTH | Age: 74
End: 2024-12-03
Payer: COMMERCIAL

## 2024-12-03 ENCOUNTER — PREP FOR PROCEDURE (OUTPATIENT)
Dept: ORTHOPEDIC SURGERY | Facility: CLINIC | Age: 74
End: 2024-12-03
Payer: COMMERCIAL

## 2024-12-03 ENCOUNTER — TELEPHONE (OUTPATIENT)
Dept: CARDIOLOGY | Facility: CLINIC | Age: 74
End: 2024-12-03
Payer: COMMERCIAL

## 2024-12-03 DIAGNOSIS — S82.851A CLOSED TRIMALLEOLAR FRACTURE OF RIGHT ANKLE, INITIAL ENCOUNTER: ICD-10-CM

## 2024-12-03 DIAGNOSIS — S93.431A ANKLE SYNDESMOSIS DISRUPTION, RIGHT, INITIAL ENCOUNTER: ICD-10-CM

## 2024-12-03 NOTE — TELEPHONE ENCOUNTER
Called pt to give her directives of holding Eliquis for 3 days prior per Dr Rajan's directive. He also would like to make appointment  a virtual.

## 2024-12-03 NOTE — TELEPHONE ENCOUNTER
Pt called for directives on Preop instructions regarding her Eliquis. She is having a broken ankle repair on December 13th. She will need to know when to stop and restart Eliquis. She is also scheduled for a follow up appointment with you on December 10th which she states would be hard to attend due to her being non weight bearing. Is this appointment necessary for her to receive cardiac clearance?  She had her echo on 9/9/24.

## 2024-12-03 NOTE — TELEPHONE ENCOUNTER
Provided directive to patient without any questions. Pt will have a virtual appointment on December 10th

## 2024-12-04 ENCOUNTER — HOME CARE VISIT (OUTPATIENT)
Dept: HOME HEALTH SERVICES | Facility: HOME HEALTH | Age: 74
End: 2024-12-04
Payer: COMMERCIAL

## 2024-12-04 DIAGNOSIS — E78.00 HYPERCHOLESTEROLEMIA: ICD-10-CM

## 2024-12-04 RX ORDER — ATORVASTATIN CALCIUM 20 MG/1
20 TABLET, FILM COATED ORAL DAILY
Qty: 90 TABLET | Refills: 1 | Status: SHIPPED | OUTPATIENT
Start: 2024-12-04

## 2024-12-04 SDOH — HEALTH STABILITY: PHYSICAL HEALTH: PHYSICAL EXERCISE: 10

## 2024-12-04 SDOH — HEALTH STABILITY: PHYSICAL HEALTH: EXERCISE ACTIVITIES SETS: 2

## 2024-12-04 SDOH — HEALTH STABILITY: PHYSICAL HEALTH: EXERCISE TYPE: B LE

## 2024-12-04 SDOH — HEALTH STABILITY: PHYSICAL HEALTH: PHYSICAL EXERCISE: SIT, SUPINE, STAND

## 2024-12-04 SDOH — HEALTH STABILITY: PHYSICAL HEALTH: EXERCISE ACTIVITY: OPEN CHAIN

## 2024-12-04 ASSESSMENT — ENCOUNTER SYMPTOMS
PAIN LOCATION - PAIN DURATION: HR
PERSON REPORTING PAIN: PATIENT
PAIN SEVERITY GOAL: 0/10
PAIN LOCATION: GENERALIZED
PAIN LOCATION - EXACERBATING FACTORS: ROM
PAIN LOCATION - PAIN SEVERITY: 6/10
SUBJECTIVE PAIN PROGRESSION: WAXING AND WANING
PAIN LOCATION - PAIN FREQUENCY: INTERMITTENT
PAIN LOCATION - PAIN QUALITY: ACHE
LOWEST PAIN SEVERITY IN PAST 24 HOURS: 1/10
MUSCLE WEAKNESS: 1
HIGHEST PAIN SEVERITY IN PAST 24 HOURS: 6/10
PAIN: 1
PAIN LOCATION - RELIEVING FACTORS: MEDS

## 2024-12-04 ASSESSMENT — ACTIVITIES OF DAILY LIVING (ADL)
OASIS_M1830: 03
AMBULATION_DISTANCE/DURATION_TOLERATED: 20 FT
HOME_HEALTH_OASIS: 00
PHYSICAL TRANSFERS ASSESSED: 1
CURRENT_FUNCTION: STAND BY ASSIST
AMBULATION ASSISTANCE ON FLAT SURFACES: 1

## 2024-12-10 ENCOUNTER — APPOINTMENT (OUTPATIENT)
Dept: CARDIOLOGY | Facility: CLINIC | Age: 74
End: 2024-12-10
Payer: COMMERCIAL

## 2024-12-10 DIAGNOSIS — R06.02 SOB (SHORTNESS OF BREATH) ON EXERTION: Primary | ICD-10-CM

## 2024-12-10 PROCEDURE — 1159F MED LIST DOCD IN RCRD: CPT | Performed by: INTERNAL MEDICINE

## 2024-12-10 PROCEDURE — 1160F RVW MEDS BY RX/DR IN RCRD: CPT | Performed by: INTERNAL MEDICINE

## 2024-12-10 PROCEDURE — 99211 OFF/OP EST MAY X REQ PHY/QHP: CPT | Performed by: INTERNAL MEDICINE

## 2024-12-10 PROCEDURE — 1036F TOBACCO NON-USER: CPT | Performed by: INTERNAL MEDICINE

## 2024-12-10 NOTE — PROGRESS NOTES
Primary Care Physician: Mely Brar MD  Date of Visit: 12/10/2024 10:00 AM EST  Location of visit: [facility] [place] [address]     Chief Complaint:   No chief complaint on file.       HPI / Summary:   Emy Potts is a 74 y.o. female presents for followup.   Follow-up September 30 visit  She had seen me as part of a perioperative risk evaluation and anticipated shoulder replacement she has a history of A-fib on beta-blocker and Eliquis I estimated her cardiovascular risk is being acceptable  She was evaluated in late November with a fracture of her ankle after another fall she apparently had another fracture in her foot and the distal fibula on the right  Surgery schedule for right foot Friday.    No further palpitations.  No Cp, WOODALL, edema.  Specialty Problems          Cardiology Problems    Hypotension     Formatting of this note might be different from the original. Hypotensive to SBP 70s prompting AMET call. Fluid resuscitated and transfused 3u PRBCs for acute blood loss anemia. Now HDS.   -- Transfuse blood products and IVF boluses as needed Last Assessment & Plan: Formatting of this note might be different from the original. Assessment: h/o hypotension; currently being treated for HTN - BP today well-controlled         HTN (hypertension)     Last Assessment & Plan: Formatting of this note might be different from the original. Assessment: well-controlled Pt was recently put on a second BP med by PCP - she is unsure of the name -- she has gotten some dizziness after starting this med - advised pt to discuss with PCP         Benign essential hypertension    Hypercholesterolemia    Palpitations with regular cardiac rhythm        Past Medical History:   Diagnosis Date    Acute left ankle pain 01/08/2024    Acute pain due to trauma 01/08/2024    Biceps tendinitis of right upper extremity 03/29/2024    Complication of surgical procedure 04/29/2024    Contracture of left shoulder 08/07/2020    History of hypertension  06/10/2024    History of infectious disease 06/10/2024    History of subdural hematoma 08/07/2020    Last Assessment & Plan: Formatting of this note might be different from the original. Assessment: H/o subdural hematoma after falling out of bed s/p surgery with subsequent procedure for staph infection - on doxy for hx of staph infection Residual deficits include loss of sensation of right hand and proprioception issue of right hand    Injury of tendon of long head of biceps, left, initial encounter 08/07/2020    Joint stiffness 04/29/2024    Low back pain 04/29/2024    Muscle weakness 04/29/2024    Pain in left shoulder 08/20/2020    Palpitations 06/10/2024    Personal history of other diseases of the circulatory system 01/14/2014    History of hypertension    Personal history of other diseases of the circulatory system 07/01/2020    History of subdural hematoma    Personal history of other diseases of the musculoskeletal system and connective tissue 06/26/2018    History of low back pain    Personal history of other infectious and parasitic diseases 07/01/2020    History of staphylococcal infection    Personal history of other specified conditions 06/26/2018    History of insomnia    Personal history of pneumonia (recurrent) 07/17/2018    History of pneumonia    Unspecified asthma, uncomplicated ([facility]) 03/19/2017    Asthma    Urinary tract infection 06/10/2024    Weakness 06/10/2024          Past Surgical History:   Procedure Laterality Date    BACK SURGERY  01/14/2014    Back Surgery    CT HEAD ANGIO W AND WO IV CONTRAST  8/18/2019    CT HEAD ANGIO W AND WO IV CONTRAST 8/18/2019 [facility]    MR HEAD ANGIO WO IV CONTRAST  8/2/2019    MR HEAD ANGIO WO IV CONTRAST 8/2/2019 [facility]    MR NECK ANGIO WO IV CONTRAST  8/2/2019    MR NECK ANGIO WO IV CONTRAST 8/2/2019 [facility]    NECK SURGERY  01/14/2014    Neck Surgery    OTHER SURGICAL HISTORY  01/14/2014    General Surgery     OTHER SURGICAL HISTORY  07/01/2020    Brain surgery    OTHER SURGICAL HISTORY  07/15/2022    Ectopic pregnancy removal with salpingectomy          Social History:   reports that she has quit smoking. Her smoking use included cigarettes. She has never used smokeless tobacco. She reports that she does not currently use alcohol. She reports that she does not use drugs.      Allergies:  Allergies   Allergen Reactions    Allerg Xt,D.Farinae-D.Pteronys Unknown     Other Reaction(s): Other: See Comments      Sneezing    Cat Hair Standardized Allergenic Extract Unknown     Other Reaction(s): Other: See Comments      sneezing       Outpatient Medications:  Current Outpatient Medications   Medication Instructions    albuterol 90 mcg/actuation inhaler 2 puffs, inhalation, Every 4 hours PRN    alendronate (FOSAMAX) 70 mg, oral, Every 7 days, Take in the morning with a full glass of water, on an empty stomach, and do not take anything else by mouth or lie down for the next 30 min.    apixaban (ELIQUIS) 5 mg, oral, 2 times daily    atorvastatin (LIPITOR) 20 mg, oral, Daily    cholecalciferol (Vitamin D-3) 25 MCG (1000 UT) tablet 1 tablet, Daily    cycloSPORINE (Restasis) 0.05 % ophthalmic emulsion 1 drop, As needed    doxycycline (VIBRAMYCIN) 100 mg, oral, Every 12 hours    finasteride (PROSCAR) 5 mg, Daily    gabapentin (Neurontin) 400 mg capsule 1 capsule, 4 times daily    metoprolol tartrate (LOPRESSOR) 25 mg, oral, 2 times daily    minoxidil (LONITEN) 2.5 mg, Daily    nystatin (Mycostatin) cream As needed    olmesartan (BENICAR) 20 mg, oral, Daily    tiZANidine (Zanaflex) 2 mg tablet 4 times daily PRN    traMADol (Ultram) 50 mg tablet 1 tablet, Every 4 hours    traZODone (DESYREL) 50 mg, Nightly       ROS     Physical Exam:  There were no vitals filed for this visit.  Wt Readings from Last 5 Encounters:   11/23/24 65.8 kg (145 lb)   11/15/24 65.8 kg (145 lb)   02/28/24 69.5 kg (153 lb 3.5 oz)   02/05/24 69.5 kg (153 lb 3.5  oz)   06/29/23 66.7 kg (147 lb)     There is no height or weight on file to calculate BMI.   Physical Exam      Last Labs:  CMP:  Recent Labs     02/01/24  0818 09/22/22  0607 09/20/22  1933 10/28/21  1030 10/20/21  1100    141 136 142 140   K 5.3 4.0 3.9 4.1 3.6   CL 98 102 97* 103 99   CO2 33* 28 30 31 30   ANIONGAP 15 15 13 12 15   BUN 33* 22 51* 37* 38*   CREATININE 0.75 0.67 1.17* 0.70 0.84   EGFR 85  --   --   --   --    GLUCOSE 93 93 82 92 47*     Recent Labs     02/01/24 0818 09/20/22 1933 09/26/21  0537 09/24/21 0752 09/22/21  0508 08/05/20  1010 06/10/20  1359 10/01/19  1100 07/24/19  0448 03/26/19  2149   ALBUMIN 4.2 4.3 3.7 4.2 3.9 4.9 4.5 4.5   < > 4.6   ALKPHOS 37 45  --   --   --  68 43 47   < > 49   ALT 14 35  --   --   --  18 22 22   < > 18   AST 15 69*  --   --   --  18 18 20   < > 21   BILITOT 0.6 0.8  --   --   --  0.7 0.9 0.5   < > 0.4   LIPASE  --   --   --   --   --   --   --   --   --  15    < > = values in this interval not displayed.     CBC:  Recent Labs     02/01/24  0818 09/22/22  0607 09/20/22  1933 10/28/21  1030 10/20/21  1100   WBC 7.4 4.0* 6.4 4.4 4.1*   HGB 13.4 10.9* 12.4 13.4 11.2*   HCT 40.4 35.1* 38.7 42.2 37.0    204 207 231 214   MCV 91 101* 99 99 102*     COAG:   Recent Labs     09/24/21 0752 09/22/21  0508 08/05/20  1010 06/10/20  1359 08/13/19  0926   INR 0.9 0.9 1.0 1.0 1.1     HEME/ENDO:  Recent Labs     02/01/24  0818 09/03/18  1402 07/04/18  1508 07/04/18  0553   TSH 3.15 2.70 1.54 1.94      CARDIAC:   Recent Labs     03/26/19  2149   BNP 16     Recent Labs     02/01/24  0818 04/25/18  0850   CHOL 276* 260*   LDLF  --  168*   HDL 76.9 73.4   TRIG 79 95       Last Cardiology Tests:  ECG:      Echo:  Echo Results:  Transthoracic Echo (TTE) Complete 09/27/2024    [facility], [address] [place] [address]  Tel [phone] and Fax [phone]    TRANSTHORACIC ECHOCARDIOGRAM REPORT      Patient Name:       MACY Mayers Physician:    [id number] Wali Rogers MD  Study Date:        9/27/2024            Ordering Provider:    [id number] PARDEEP MCCLELLAND  MRN/PID:           [id number]             Fellow:  Accession#:        [id number]         Nurse:  Date of Birth/Age: 1950 / 73      Sonographer:          Frederick verdugo                                      RDMAXIMUS  Gender:            F                    Additional Staff:  Height:            160.02 cm            Admit Date:  Weight:            69.40 kg             Admission Status:     Outpatient  BSA / BMI:         1.73 m2 / 27.10      Encounter#:           [id number]  kg/m2  Blood Pressure:    /                    Department Location:  [facility]  Echo Lab    Study Type:    TRANSTHORACIC ECHO (TTE) COMPLETE  Diagnosis/ICD: Shortness of breath-R06.02  Indication:    SOB (shortness of breath)  CPT Code:      Echo Complete w Full Doppler-[id number]    Patient History:  Pertinent History: HTN; CKD: SOB; JULIANNA.    Study Detail: The following Echo studies were performed: 2D, M-Mode, Doppler and  color flow. Technically challenging study due to body habitus,  patient lying in supine position and poor acoustic windows.      PHYSICIAN INTERPRETATION:  Left Ventricle: Left ventricular ejection fraction is normal, by visual estimate at 65-70%. There are no regional left ventricular wall motion abnormalities. The left ventricular cavity size is normal. Spectral Doppler shows a pseudonormal pattern of left ventricular diastolic filling.  Left Atrium: The left atrium is mildly dilated.  Right Ventricle: The right ventricle is normal in size. There is normal right ventricular global systolic function. RV S'= 12 mm/s.  Right Atrium: The right atrium is normal in size.  Aortic Valve: The aortic valve is probably trileaflet. There is mild aortic valve cusp calcification. There is evidence of mild aortic valve stenosis. The aortic valve dimensionless index is 0.60. The  peak aortic velocity was obtained from the apical view. There is no evidence of aortic valve regurgitation. The peak instantaneous gradient of the aortic valve is 17.4 mmHg. The mean gradient of the aortic valve is 8.3 mmHg.  Mitral Valve: The mitral valve is mildly thickened. There is mild mitral annular calcification. There is no evidence of mitral valve regurgitation.  Tricuspid Valve: The tricuspid valve is structurally normal. There is mild tricuspid regurgitation. The Doppler estimated RVSP is moderately elevated at 60.8 mmHg.  Pulmonic Valve: The pulmonic valve is not well visualized. The pulmonic valve regurgitation was not well visualized.  Pericardium: There is no pericardial effusion noted. There is an anterior clear space.  Aorta: The aortic root is normal.  Systemic Veins: The inferior vena cava appears normal in size, with IVC inspiratory collapse less than 50%.  In comparison to the previous echocardiogram(s): There are no prior studies on this patient for comparison purposes.      CONCLUSIONS:  1. Left ventricular ejection fraction is normal, by visual estimate at 65-70%.  2. Spectral Doppler shows a pseudonormal pattern of left ventricular diastolic filling.  3. There is normal right ventricular global systolic function.  4. The left atrium is mildly dilated.  5. Moderately elevated right ventricular systolic pressure.  6. Mild aortic valve stenosis.  7. The inferior vena cava appears normal in size, with IVC inspiratory collapse less than 50%.    QUANTITATIVE DATA SUMMARY:    2D MEASUREMENTS:          Normal Ranges:  Ao Root d:       2.88 cm  (2.0-3.7cm)  LAs:             4.16 cm  (2.7-4.0cm)  IVSd:            1.06 cm  (0.6-1.1cm)  LVPWd:           0.78 cm  (0.6-1.1cm)  LVIDd:           4.28 cm  (3.9-5.9cm)  LVIDs:           2.89 cm  LV Mass Index:   73 g/m2  LVEDV Index:     48 ml/m2  LV % FS          32.5 %      LA VOLUME:                   Normal Ranges:  LA Vol A4C:       64.4 ml     (22+/-6mL/m2)  LA Vol A2C:       67.3 ml  LA Vol BP:        67.0 ml  LA Vol Index A4C: 37.3ml/m2  LA Vol Index A2C: 39.0 ml/m2  LA Vol Index BP:  38.8 ml/m2  LA Volume Index:  38.7 ml/m2  LA Vol A4C:       60.8 ml  LA Vol A2C:       65.1 ml  LA Vol Index BSA: 36.5 ml/m2      LV SYSTOLIC FUNCTION BY 2D PLANIMETRY (MOD):  Normal Ranges:  EF-A4C View:    68 % (>=55%)  EF-A2C View:    71 %  EF-Biplane:     69 %  EF-Visual:      68 %  LV EF Reported: 68 %      LV DIASTOLIC FUNCTION:             Normal Ranges:  MV Peak E:             0.80 m/s    (0.7-1.2 m/s)  MV Peak A:             0.87 m/s    (0.42-0.7 m/s)  E/A Ratio:             0.92        (1.0-2.2)  MV e'                  0.065 m/s   (>8.0)  MV lateral e'          0.07 m/s  MV medial e'           0.06 m/s  MV A Dur:              137.01 msec  E/e' Ratio:            12.30       (<8.0)  MV DT:                 263 msec    (150-240 msec)      AORTIC VALVE:                      Normal Ranges:  AoV Vmax:                2.09 m/s  (<=1.7m/s)  AoV Peak P.4 mmHg (<20mmHg)  AoV Mean P.3 mmHg  (1.7-11.5mmHg)  LVOT Max Tony:            1.15 m/s  (<=1.1m/s)  AoV VTI:                 42.81 cm  (18-25cm)  LVOT VTI:                25.64 cm  LVOT Diameter:           1.80 cm   (1.8-2.4cm)  AoV Area, VTI:           1.52 cm2  (2.5-5.5cm2)  AoV Area,Vmax:           1.40 cm2  (2.5-4.5cm2)  AoV Dimensionless Index: 0.60      RIGHT VENTRICLE:  RV Basal 2.66 cm  RV Mid   2.10 cm  RV Major 5.7 cm  RV s'    0.12 m/s      TRICUSPID VALVE/RVSP:          Normal Ranges:  Peak TR Velocity:     3.38 m/s  RV Syst Pressure:     61 mmHg  (< 30mmHg)      PULMONIC VALVE:          Normal Ranges:  PV Max Tony:     0.6 m/s  (0.6-0.9m/s)  PV Max P.5 mmHg      [id number] Wali Rogers MD  Electronically signed on 2024 at 12:01:41 PM        ** Final **       Cath:      Stress Test:  Stress Results:  No results found for this or any previous visit from the past 365 days.          Cardiac Imaging:  CT ankle right wo IV contrast  Narrative: STUDY:  CT Extremity; Completed Time:  11/23/2024 3:57 PM  INDICATION:  Presurgical planning.  Trimalleolar fracture.  COMPARISON:  XR right ankle 11/23/2024, 11/21/2024.  ACCESSION NUMBER(S):  [id number]  ORDERING CLINICIAN:  SANDRA PIERCE  TECHNIQUE:  Thin section axial images were obtained through the right  ankle without intravenous contrast.  Orthogonal reconstructed images  were obtained and reviewed.    Automated mA/kV exposure control was utilized and patient examination  was performed in strict accordance with principles of ALARA.  FINDINGS:  There is an acute mildly displaced fracture involving the  right anterior medial malleolus extending to the tibiotalar joint.   There is an additional small mildly displaced acute fracture involving  the lateral distal aspect of the right tibia extending to the  tibiotalar joint as well as a tiny avulsion fracture involving the  posterior distal aspect of the right tibia.  There is an acute  comminuted and mildly impacted fracture involving the distal  metadiaphysis of right fibula associated with mild medial and anterior  apical angulation.  There is mild to moderate generalized soft tissue  swelling surrounding the right ankle joint and extending along the  lateral and dorsal surface of the right foot.  Impression: 1.  Acute mildly displaced fracture involving the right anterior  medial malleolus with extension to the tibiotalar joint.  2.  Small acute displaced fracture involving the lateral distal aspect  of the right tibia with extension to the tibiotalar joint.  3.  Tiny avulsion fracture involving the posterior and distal aspect  of the right tibia.  4.  Acute comminuted and mildly impacted fracture involving the distal  metadiaphysis of the right fibula with mild medial and anterior apical  angulation.  Signed by Tank Stauffer MD  XR ankle right 3+ views  Narrative: STUDY:  Ankle  Radiographs;  11/23/2024, 2:24 PM.  INDICATION:  Known trimalleolar fracture, post splint x-ray.  COMPARISON:  XR foot: 11/23/24; XR tibia/fibula: 11/23/24; XR ankle: 11/21/24.  ACCESSION NUMBER(S):  [id number]  ORDERING CLINICIAN:  SANDRA PIERCE  TECHNIQUE:  Three view(s) of the right ankle.  FINDINGS:    Again seen is the slightly displaced trimalleolar fracture. Following  reduction and splinting, the alignment is not significantly changed.  Impression: No significant change in the alignment of the trimalleolar fracture  following reduction and splinting.  Signed by Rubio Garcia MD  XR foot right 3+ views  Narrative: STUDY:  Foot Radiographs; 11/23/2024, 12:55 PM.  INDICATION:  Known trimalleolar fracture sustained 8 days ago, assessing for  surrounding fractures.  COMPARISON:  XR ankle: 11/21/24.  ACCESSION NUMBER(S):  [id number]  ORDERING CLINICIAN:  SANDRA PIERCE  TECHNIQUE:  Three view(s) of the right foot.  FINDINGS:    There is no displaced fracture.  The alignment is anatomic.  No soft  tissue abnormality is seen.  Impression: No fractures or dislocations in the foot.  Signed by Rubio Garcia MD  XR tibia fibula right 2 views  Narrative: STUDY:  Tibia and Fibula Radiographs; 11/23/2024, 12:55 PM.  INDICATION:  Known trimalleolar fracture sustained 8 days ago, assessing for  surrounding fractures.  COMPARISON:  XR ankle: 11/21/24.  ACCESSION NUMBER(S):  [id number]  ORDERING CLINICIAN:  SANDRA PIERCE  TECHNIQUE:  Three view(s) of the right tibia and fibula.  FINDINGS:    Again seen are tightly displaced fractures of the distal fibula,  medial malleolus, and posterior distal tibia.  The alignment is  anatomic.  No soft tissue abnormality is seen.  Impression: Slightly displaced trimalleolar fractures of the distal tibia and  fibula.  Signed by Rubio Garcia MD        Assessment/Plan     This was a virtual visit for her anticipated ankle surgery.  She seems stable from her clinical description of  symptoms.  Her echo results showed moderate pulmonary hypertension but aside from that there was only mild aortic stenosis and a preserved EF.  Since she remains clinically stable from a cardiac standpoint I think she should be considered an acceptable cardiovascular risk for anesthesia and her anticipated orthopedic procedure.  I would like to see her in office follow-up evaluation in 3 months.  Surveillance labs have been ordered prior to that visit      Orders:  No orders of the defined types were placed in this encounter.     Followup Appts:  Future Appointments   Date Time Provider Department Center   12/23/2024  2:20 PM Mely Brar MD [id number] [facility]   [facility]           ____________________________________________________________  Curtis Rajan MD    Senior Attending Physician  [facility]  [facility]

## 2024-12-11 ENCOUNTER — PREP FOR PROCEDURE (OUTPATIENT)
Dept: OPERATING ROOM | Facility: CLINIC | Age: 74
End: 2024-12-11
Payer: COMMERCIAL

## 2024-12-11 RX ORDER — CEFAZOLIN SODIUM 2 G/50ML
2 SOLUTION INTRAVENOUS ONCE
Status: CANCELLED | OUTPATIENT
Start: 2024-12-11 | End: 2024-12-11

## 2024-12-13 ENCOUNTER — APPOINTMENT (OUTPATIENT)
Dept: RADIOLOGY | Facility: HOSPITAL | Age: 74
End: 2024-12-13
Payer: COMMERCIAL

## 2024-12-13 ENCOUNTER — ANESTHESIA EVENT (OUTPATIENT)
Dept: OPERATING ROOM | Facility: HOSPITAL | Age: 74
End: 2024-12-13
Payer: COMMERCIAL

## 2024-12-13 ENCOUNTER — HOME HEALTH ADMISSION (OUTPATIENT)
Dept: HOME HEALTH SERVICES | Facility: HOME HEALTH | Age: 74
End: 2024-12-13
Payer: COMMERCIAL

## 2024-12-13 ENCOUNTER — ANESTHESIA (OUTPATIENT)
Dept: OPERATING ROOM | Facility: HOSPITAL | Age: 74
End: 2024-12-13
Payer: COMMERCIAL

## 2024-12-13 ENCOUNTER — HOSPITAL ENCOUNTER (OUTPATIENT)
Facility: HOSPITAL | Age: 74
Setting detail: OUTPATIENT SURGERY
Discharge: HOME | End: 2024-12-13
Attending: ORTHOPAEDIC SURGERY | Admitting: ORTHOPAEDIC SURGERY
Payer: COMMERCIAL

## 2024-12-13 ENCOUNTER — DOCUMENTATION (OUTPATIENT)
Dept: HOME HEALTH SERVICES | Facility: HOME HEALTH | Age: 74
End: 2024-12-13

## 2024-12-13 VITALS
RESPIRATION RATE: 14 BRPM | HEART RATE: 64 BPM | OXYGEN SATURATION: 96 % | TEMPERATURE: 98.2 F | DIASTOLIC BLOOD PRESSURE: 84 MMHG | BODY MASS INDEX: 24.16 KG/M2 | WEIGHT: 145 LBS | HEIGHT: 65 IN | SYSTOLIC BLOOD PRESSURE: 142 MMHG

## 2024-12-13 DIAGNOSIS — S82.851A CLOSED TRIMALLEOLAR FRACTURE OF RIGHT ANKLE, INITIAL ENCOUNTER: ICD-10-CM

## 2024-12-13 DIAGNOSIS — S93.431A ANKLE SYNDESMOSIS DISRUPTION, RIGHT, INITIAL ENCOUNTER: Primary | ICD-10-CM

## 2024-12-13 PROCEDURE — 2500000004 HC RX 250 GENERAL PHARMACY W/ HCPCS (ALT 636 FOR OP/ED): Performed by: STUDENT IN AN ORGANIZED HEALTH CARE EDUCATION/TRAINING PROGRAM

## 2024-12-13 PROCEDURE — 29898 ANKLE ARTHROSCOPY/SURGERY: CPT | Performed by: ORTHOPAEDIC SURGERY

## 2024-12-13 PROCEDURE — 7100000009 HC PHASE TWO TIME - INITIAL BASE CHARGE: Performed by: ORTHOPAEDIC SURGERY

## 2024-12-13 PROCEDURE — 2720000007 HC OR 272 NO HCPCS: Performed by: ORTHOPAEDIC SURGERY

## 2024-12-13 PROCEDURE — 3700000002 HC GENERAL ANESTHESIA TIME - EACH INCREMENTAL 1 MINUTE: Performed by: ORTHOPAEDIC SURGERY

## 2024-12-13 PROCEDURE — 2700000047 HC OR 270 NO HCPCS: Performed by: ORTHOPAEDIC SURGERY

## 2024-12-13 PROCEDURE — 7100000001 HC RECOVERY ROOM TIME - INITIAL BASE CHARGE: Performed by: ORTHOPAEDIC SURGERY

## 2024-12-13 PROCEDURE — C1713 ANCHOR/SCREW BN/BN,TIS/BN: HCPCS | Performed by: ORTHOPAEDIC SURGERY

## 2024-12-13 PROCEDURE — 27822 TREATMENT OF ANKLE FRACTURE: CPT | Performed by: ORTHOPAEDIC SURGERY

## 2024-12-13 PROCEDURE — 2500000004 HC RX 250 GENERAL PHARMACY W/ HCPCS (ALT 636 FOR OP/ED): Performed by: ANESTHESIOLOGIST ASSISTANT

## 2024-12-13 PROCEDURE — 7100000010 HC PHASE TWO TIME - EACH INCREMENTAL 1 MINUTE: Performed by: ORTHOPAEDIC SURGERY

## 2024-12-13 PROCEDURE — 3600000007 HC OR TIME - EACH INCREMENTAL 1 MINUTE - PROCEDURE LEVEL TWO: Performed by: ORTHOPAEDIC SURGERY

## 2024-12-13 PROCEDURE — 3700000001 HC GENERAL ANESTHESIA TIME - INITIAL BASE CHARGE: Performed by: ORTHOPAEDIC SURGERY

## 2024-12-13 PROCEDURE — 76000 FLUOROSCOPY <1 HR PHYS/QHP: CPT | Mod: RT

## 2024-12-13 PROCEDURE — 2780000003 HC OR 278 NO HCPCS: Performed by: ORTHOPAEDIC SURGERY

## 2024-12-13 PROCEDURE — 7100000002 HC RECOVERY ROOM TIME - EACH INCREMENTAL 1 MINUTE: Performed by: ORTHOPAEDIC SURGERY

## 2024-12-13 PROCEDURE — 2500000005 HC RX 250 GENERAL PHARMACY W/O HCPCS: Performed by: STUDENT IN AN ORGANIZED HEALTH CARE EDUCATION/TRAINING PROGRAM

## 2024-12-13 PROCEDURE — 27829 TREAT LOWER LEG JOINT: CPT | Performed by: ORTHOPAEDIC SURGERY

## 2024-12-13 PROCEDURE — 3600000002 HC OR TIME - INITIAL BASE CHARGE - PROCEDURE LEVEL TWO: Performed by: ORTHOPAEDIC SURGERY

## 2024-12-13 DEVICE — SCREW, LOW PROFILE, CORTICAL, 3.5 X 14MM, SS: Type: IMPLANTABLE DEVICE | Site: ANKLE | Status: FUNCTIONAL

## 2024-12-13 DEVICE — IMPLANTABLE DEVICE: Type: IMPLANTABLE DEVICE | Site: ANKLE | Status: FUNCTIONAL

## 2024-12-13 DEVICE — K-LESS T-ROPE W/DRV, SYN REPR, SS
Type: IMPLANTABLE DEVICE | Site: ANKLE | Status: FUNCTIONAL
Brand: ARTHREX®

## 2024-12-13 DEVICE — SCREW, LOW PROFILE, CANN, LONG THREAD, 4.0 X 50MM: Type: IMPLANTABLE DEVICE | Site: ANKLE | Status: FUNCTIONAL

## 2024-12-13 DEVICE — BONE, CHIP, CANCELLOUS, 1.7-10 MM, 5 CC: Type: IMPLANTABLE DEVICE | Site: ANKLE | Status: FUNCTIONAL

## 2024-12-13 RX ORDER — CEFAZOLIN 1 G/1
INJECTION, POWDER, FOR SOLUTION INTRAVENOUS AS NEEDED
Status: DISCONTINUED | OUTPATIENT
Start: 2024-12-13 | End: 2024-12-13

## 2024-12-13 RX ORDER — OXYCODONE AND ACETAMINOPHEN 5; 325 MG/1; MG/1
1 TABLET ORAL EVERY 6 HOURS PRN
Qty: 28 TABLET | Refills: 0 | Status: SHIPPED | OUTPATIENT
Start: 2024-12-13

## 2024-12-13 RX ORDER — MIDAZOLAM HYDROCHLORIDE 1 MG/ML
INJECTION, SOLUTION INTRAMUSCULAR; INTRAVENOUS AS NEEDED
Status: DISCONTINUED | OUTPATIENT
Start: 2024-12-13 | End: 2024-12-13

## 2024-12-13 RX ORDER — ROCURONIUM BROMIDE 10 MG/ML
INJECTION, SOLUTION INTRAVENOUS AS NEEDED
Status: DISCONTINUED | OUTPATIENT
Start: 2024-12-13 | End: 2024-12-13

## 2024-12-13 RX ORDER — PSYLLIUM HUSK 0.4 G
1 CAPSULE ORAL 2 TIMES DAILY
Qty: 180 TABLET | Refills: 0 | Status: SHIPPED | OUTPATIENT
Start: 2024-12-13 | End: 2025-03-13

## 2024-12-13 RX ORDER — LABETALOL HYDROCHLORIDE 5 MG/ML
5 INJECTION, SOLUTION INTRAVENOUS ONCE AS NEEDED
Status: DISCONTINUED | OUTPATIENT
Start: 2024-12-13 | End: 2024-12-13 | Stop reason: HOSPADM

## 2024-12-13 RX ORDER — CEFAZOLIN SODIUM 2 G/100ML
2 INJECTION, SOLUTION INTRAVENOUS ONCE
Status: DISCONTINUED | OUTPATIENT
Start: 2024-12-13 | End: 2024-12-13 | Stop reason: HOSPADM

## 2024-12-13 RX ORDER — ROPIVACAINE HYDROCHLORIDE 5 MG/ML
INJECTION, SOLUTION EPIDURAL; INFILTRATION; PERINEURAL AS NEEDED
Status: DISCONTINUED | OUTPATIENT
Start: 2024-12-13 | End: 2024-12-13

## 2024-12-13 RX ORDER — PROPOFOL 10 MG/ML
INJECTION, EMULSION INTRAVENOUS AS NEEDED
Status: DISCONTINUED | OUTPATIENT
Start: 2024-12-13 | End: 2024-12-13

## 2024-12-13 RX ORDER — LIDOCAINE HYDROCHLORIDE 10 MG/ML
0.1 INJECTION, SOLUTION EPIDURAL; INFILTRATION; INTRACAUDAL; PERINEURAL ONCE
Status: DISCONTINUED | OUTPATIENT
Start: 2024-12-13 | End: 2024-12-13 | Stop reason: HOSPADM

## 2024-12-13 RX ORDER — PHENYLEPHRINE HCL IN 0.9% NACL 1 MG/10 ML
SYRINGE (ML) INTRAVENOUS AS NEEDED
Status: DISCONTINUED | OUTPATIENT
Start: 2024-12-13 | End: 2024-12-13

## 2024-12-13 RX ORDER — ONDANSETRON HYDROCHLORIDE 2 MG/ML
4 INJECTION, SOLUTION INTRAVENOUS ONCE AS NEEDED
Status: DISCONTINUED | OUTPATIENT
Start: 2024-12-13 | End: 2024-12-13 | Stop reason: HOSPADM

## 2024-12-13 RX ORDER — LIDOCAINE HYDROCHLORIDE 20 MG/ML
INJECTION, SOLUTION INFILTRATION; PERINEURAL AS NEEDED
Status: DISCONTINUED | OUTPATIENT
Start: 2024-12-13 | End: 2024-12-13

## 2024-12-13 RX ORDER — FENTANYL CITRATE 50 UG/ML
INJECTION, SOLUTION INTRAMUSCULAR; INTRAVENOUS AS NEEDED
Status: DISCONTINUED | OUTPATIENT
Start: 2024-12-13 | End: 2024-12-13

## 2024-12-13 RX ORDER — PHENYLEPHRINE 10 MG/250 ML(40 MCG/ML)IN 0.9 % SOD.CHLORIDE INTRAVENOUS
CONTINUOUS PRN
Status: DISCONTINUED | OUTPATIENT
Start: 2024-12-13 | End: 2024-12-13

## 2024-12-13 RX ORDER — DOCUSATE SODIUM 100 MG/1
100 CAPSULE, LIQUID FILLED ORAL 2 TIMES DAILY
Qty: 56 CAPSULE | Refills: 0 | Status: SHIPPED | OUTPATIENT
Start: 2024-12-13 | End: 2025-01-10

## 2024-12-13 RX ORDER — ONDANSETRON HYDROCHLORIDE 2 MG/ML
INJECTION, SOLUTION INTRAVENOUS AS NEEDED
Status: DISCONTINUED | OUTPATIENT
Start: 2024-12-13 | End: 2024-12-13

## 2024-12-13 RX ORDER — ONDANSETRON 4 MG/1
4 TABLET, ORALLY DISINTEGRATING ORAL EVERY 8 HOURS PRN
Qty: 8 TABLET | Refills: 0 | Status: SHIPPED | OUTPATIENT
Start: 2024-12-13

## 2024-12-13 RX ORDER — OXYCODONE HYDROCHLORIDE 5 MG/1
5 TABLET ORAL EVERY 4 HOURS PRN
Status: DISCONTINUED | OUTPATIENT
Start: 2024-12-13 | End: 2024-12-13 | Stop reason: HOSPADM

## 2024-12-13 RX ORDER — DIPHENHYDRAMINE HYDROCHLORIDE 50 MG/ML
12.5 INJECTION INTRAMUSCULAR; INTRAVENOUS ONCE AS NEEDED
Status: DISCONTINUED | OUTPATIENT
Start: 2024-12-13 | End: 2024-12-13 | Stop reason: HOSPADM

## 2024-12-13 RX ORDER — PANTOPRAZOLE SODIUM 40 MG/1
40 TABLET, DELAYED RELEASE ORAL
Qty: 28 TABLET | Refills: 0 | Status: SHIPPED | OUTPATIENT
Start: 2024-12-13 | End: 2025-01-10

## 2024-12-13 RX ORDER — SODIUM CHLORIDE, SODIUM LACTATE, POTASSIUM CHLORIDE, CALCIUM CHLORIDE 600; 310; 30; 20 MG/100ML; MG/100ML; MG/100ML; MG/100ML
100 INJECTION, SOLUTION INTRAVENOUS CONTINUOUS
Status: DISCONTINUED | OUTPATIENT
Start: 2024-12-13 | End: 2024-12-13 | Stop reason: HOSPADM

## 2024-12-13 ASSESSMENT — PAIN - FUNCTIONAL ASSESSMENT
PAIN_FUNCTIONAL_ASSESSMENT: 0-10
PAIN_FUNCTIONAL_ASSESSMENT: VAS (VISUAL ANALOG SCALE)
PAIN_FUNCTIONAL_ASSESSMENT: 0-10

## 2024-12-13 ASSESSMENT — PAIN SCALES - GENERAL
PAINLEVEL_OUTOF10: 0 - NO PAIN

## 2024-12-13 NOTE — HH CARE COORDINATION
Home Care received a Referral for Physical Therapy and Occupational Therapy. We have processed the referral for a Start of Care on 12/16.     If you have any questions or concerns, please feel free to contact us at [phone]. Follow the prompts, enter your five digit zip code, and you will be directed to your care team on [facility] 1.

## 2024-12-13 NOTE — ANESTHESIA PREPROCEDURE EVALUATION
Patient: Emy Potts    Procedure Information       Date/Time: 12/13/24 0730    Procedure: Right Ankle Arthroscopy; Extensive Debridement; Trimalleolar Fracture Open Reduction Internal Fixation; Syndesmosis Fracture Open Reduction Internal Fixation (Right: Ankle) - Popliteal plus or minus saphenous    Location: [facility] 18 / Virtual [facility] OR    Surgeons: Karen Da Silva MD            Relevant Problems   Cardiac   (+) Benign essential hypertension   (+) HTN (hypertension)   (+) Hypercholesterolemia      Pulmonary   (+) Asthma      GI   (+) GERD (gastroesophageal reflux disease)      Hematology   (+) Anemia   (+) Erythrocytosis      Musculoskeletal   (+) Lumbar stenosis   (+) Lumbosacral scoliosis   (+) Primary osteoarthritis of left shoulder      ID   (+) Methicillin resistant Staphylococcus aureus infection       Clinical information reviewed:   Tobacco  Allergies  Meds   Med Hx  Surg Hx  OB Status  Fam Hx  Soc   Hx        NPO Detail:  NPO/Void Status  Date of Last Liquid: 12/13/24  Time of Last Liquid: 0400  Date of Last Solid: 12/12/24  Time of Last Solid: 2000         Physical Exam    Airway  Mallampati: II  TM distance: >3 FB  Neck ROM: full     Cardiovascular   Rhythm: regular  Rate: normal     Dental    Pulmonary   Breath sounds clear to auscultation     Abdominal            Anesthesia Plan    History of general anesthesia?: yes  History of complications of general anesthesia?: no    ASA 3     general     intravenous induction   Anesthetic plan and risks discussed with patient.    Plan discussed with CRNA.

## 2024-12-13 NOTE — ANESTHESIA PROCEDURE NOTES
Peripheral Block    Patient location during procedure: pre-op  Start time: 12/13/2024 7:30 AM  End time: 12/13/2024 7:31 AM  Reason for block: at surgeon's request and post-op pain management  Staffing  Performed: attending   Authorized by: Panda Montalvo MD    Performed by: Panda Montalvo MD  Preanesthetic Checklist  Completed: patient identified, IV checked, site marked, risks and benefits discussed, surgical consent, monitors and equipment checked, pre-op evaluation and timeout performed   Timeout performed at:   Peripheral Block  Patient position: laying flat  Prep: ChloraPrep and site prepped and draped  Patient monitoring: continuous pulse ox, heart rate and cardiac monitor  Block type: popliteal  Laterality: right  Injection technique: single-shot  Guidance: ultrasound guided  Local infiltration: lidocaine  Infiltration strength: 1 %  Dose: 3 mL  Needle  Needle type: short-bevel   Needle gauge: 22 G  Needle length: 8 cm  Needle localization: ultrasound guidance  Test dose: negative  Assessment  Injection assessment: negative aspiration for heme, local visualized surrounding nerve on ultrasound, no paresthesia on injection and incremental injection  Heart rate change: no  Slow fractionated injection: yes

## 2024-12-13 NOTE — OP NOTE
Right Ankle Arthroscopy; Extensive Debridement; Trimalleolar Fracture Open Reduction Internal Fixation; Syndesmosis Fracture Open Reduction Internal Fixation (R) Operative Note     Date: 2024  OR Location: [facility] A OR    Name: Emy Potts, : 1950, Age: 74 y.o., MRN: [id number], Sex: female    Diagnosis  Pre-op Diagnosis      * Closed trimalleolar fracture of right ankle, initial encounter [S82.851A]     * Ankle syndesmosis disruption, right, initial encounter [S93.431A] Post-op Diagnosis     * Closed trimalleolar fracture of right ankle, initial encounter [S82.851A]     * Ankle syndesmosis disruption, right, initial encounter [S93.431A]     Procedures  Right ankle scope with extensive debridement, right ankle ORIF of trimalleolar ankle fracture (Arthrex 1/3rd tubular plate 8 hole with 3.5mm lag and screws, 4.0mm cannulated screws), right ankle ORIF of syndesmosis (Arthrex tightrope syndesmotic x 2 divergent)     Surgeons      * Karen Da Silva - Primary    Resident/Fellow/Other Assistant:  Surgeons and Role:     * Meena Phillips DO - Resident - Assisting    Staff:   Circulator: Juve Ba Person: Chance    Anesthesia Staff: Anesthesiologist: Panda Montalvo MD  C-AA: YARY Scott; YARY Alfaro    Procedure Summary  Anesthesia: General  ASA: III  Estimated Blood Loss: less 5mL  Intra-op Medications:   Administrations occurring from 0730 to 1200 on 24:   Medication Name Total Dose   oxygen (O2) therapy 42 L   ceFAZolin (Ancef) vial 1 g 2 g   dexAMETHasone (Decadron) 4 mg/mL 4 mg   fentaNYL PF 0.05 mg/mL 150 mcg   glycopyrrolate PF (Robinul) injection 0.4 mg   LR bolus Cannot be calculated   lidocaine (Xylocaine) injection 2 % 60 mg   midazolam (Versed) 1 mg/1 mL 2 mg   ondansetron 2 mg/mL 4 mg   phenylephrine (Oscar-Synephrine) 10 mg/250 mL NS (40 mcg/mL) infusion 0.53 mg   phenylephrine 100 mcg/mL syringe 10 mL (prefilled) 200 mcg   propofol (Diprivan) injection 10  mg/mL 150 mg   rocuronium (ZeMuron) 50 mg/5 mL injection 100 mg   ropivacaine PF (Naropin) 0.5 % 50 mL   sugammadex (Bridion) 200 mg/2 mL injection 200 mg              Anesthesia Record               Intraprocedure I/O Totals          Intake    LR bolus 750.00 mL    Phenylephrine Drip 0.00 mL    The total shown is the total volume documented since Anesthesia Start was filed.    Total Intake 750 mL       Output    Est. Blood Loss 10 mL    Total Output 10 mL       Net    Net Volume 740 mL          Specimen: No specimens collected   Drains and/or Catheters: * None in log *    Tourniquet Times:     Total Tourniquet Time Documented:  Thigh (Right) - 150 minutes  Total: Thigh (Right) - 150 minutes      Implants:  Implants       Type Name Action Serial No.      Graft BONE, CHIP, CANCELLOUS, 1.7-10 MM, 5 CC - [id number] - [id number] Implanted [id number]     Implant KIT, REPAIR, TIGHTROPE XP, SYNEDESMOSIS - SNA - [id number] Implanted NA     Implant KIT, REPAIR, TIGHTROPE XP, SYNEDESMOSIS - SNA - [id number] Implanted NA     Screw PLATE, LOCKING, 8H, THIRD TUBULAR, SS - SNA - [id number] Implanted NA     Screw SCREW, LOW PROFILE, LOCKING, 2.7MM X 26MM, SS - SNA - [id number] Wasted NA     Screw SCREW, LOCKING, 3.5 X 26MM, LOW PROFILE, SS - SNA - [id number] Implanted NA     Screw SCREW, LOW PROFILE, LOCKING, 3.5 X 14MM, SS - SNA - [id number] Implanted NA     Screw SCREW, LOW PROFILE, CORTICAL, 3.5 X 12MM. SS - SNA - [id number] Implanted NA     Screw SCREW, LOW PROFILE, CORTICAL, 3.5 X 16M, SS - SNA - [id number] Implanted NA     Screw SCREW, LOW PROFILE, NEHA, LONG THREAD, 4.0 X 50MM - SNA - [id number] Implanted NA          Indications: Emy Potts is an 74 y.o. female who is having surgery for Closed trimalleolar fracture of right ankle, initial encounter [L52.492I]  Ankle syndesmosis disruption, right, initial encounter [S99.271A].  After discussing the alternatives of treatment in detail with the patient, the  patient selected surgical intervention and/or reconstruction.  We discussed with the patient risks and benefits of the procedure(s).  Risks of surgery were reviewed including pain, bleeding, infection, wound healing problems, soft tissue or bone healing problems, injury to nerves or vessels, implant or hardware related complications, chronic extremity stiffness or pain or swelling, post-traumatic arthritis, recurrent symptoms, DVT, PE and other medical complications.  After the patient's questions were answered in detail including post-operative course requiring nonweightbearing and the extensive rehabilitation needed after surgery, the patient then gave informed consent for the procedure.    DESCRIPTION OF PROCEDURE  The patient was identified in the pre-operative area and the right surgical extremity was marked with a skin marker to designate surgical site.  Anesthesia consult placed popliteal block without complication.  Patient then was brought back to the operating room.  A huddle was called and confirmed upon entry into the operating room as per protocol.  Time out was called and confirmed prior to skin incision.  General anesthetic was administered and LMA placed without complication.  Patient received IV Ancef and prior to skin incision as per protocol.  DVT prophylaxis was performed using stocking and SCD application on well leg.  A well-padded tourniquet was placed on the right upper thigh and was elevated to 280mmHg for 150 minutes during the case.  The patient then was carefully positioned on the beanbag with bump underneath the ipsilateral hip to help with ankle access during the case.  All superficial nerve areas and bony prominences were well-padded and protected during the case and neutral spinal alignment maintained.  The operative leg was placed in a well-padded Ferkel leg jordan to protect the neurovascular structures.  We then proceeded to prep and drape in the usual standard sterile fashion.   The sterile ankle stirrup and distractor system was gently applied to the operative ankle.  Anatomical landmarks of the right ankle were then identified and anteromedial portal localized with an 18 gauge spinal needle.  A small nick in the skin was made with an 11 blade and portal was then made with a mosquito hemostat.  We then introduced the 2.7mm 30 degree arthroscope into the ankle joint.  Looking anterolaterally, we then localized our anterolateral portal under direct visualization with an 18 gauge spinal needle.  Damion and spread technique was then used to create the anterolateral portal.  We then performed diagnostic arthroscopy which revealed large hypertrophic traumatic plical folds between the tibia and talus anteriorly and anterolaterally, plical fold and medial gutter, displaced medial malleolus fracture, displaced distal fibular fracture, ruptured AITFL and PI T FL with tiny less than 5% of the joint posterior malleolar avulsion  fracture, grade II/III chondromalacia central talus extending to the anterior central talar dome.  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  Chaput fragment did not involve a significant amount of the joint surface.  We then proceeded with right ankle arthroscopy with extensive debridement of the synovial plical hypertrophic folds in the anterior Gartman ankle medial compartment and lateral compartments and posterior aspects.  We then utilized a K wire to try to spare the small Chaput fragment.  Once the K wire was placed we noted that the should put fragment was very thin like a shell with no significant bone attached and just a cartilage layer essentially.  Because it would not be able to be fixated well, we decided to excise so that the fragment would not block our syndesmotic reduction.  Fragment was  carefully excised utilizing oscillating shaver, bur and grasper.  There also was a significant anterior impingement spur on the anterior medial talus that was impinging with the tibia and this was removed with a oscillating bur and shaver.  Once the arthroscopy was completed, the operative ankle was carefully taken out of the ankle stirrup and leg jordan removed.  Portals then were closed with 4-0 nylon sutures.  We then proceeded with right ankle open reduction internal fixation of trimalleolar ankle fracture without fixation of posterior lip.  We utilized a longitudinal incision.  The linear based over the distal fibula to allow for nice soft tissue flap that would completely cover the hardware.  Incision made with 15 blade.  Bipolar cautery was used throughout the case for hemostasis.  Scissor dissection proximally to protect the superficial peroneal nerve branches.  We then carefully placed retractors.  We then utilized Philadelphia blade down to bone and elevated subperiosteal flaps to expose the significantly comminuted displaced and impacted distal fibular fracture.  There was an anterior lateral fragment that was displaced and the shaft of the fibula was impaled into the distal portion of the fibula with additional fracture plane.  We utilized a Philadelphia blade to remove some early healing callus to expose the fracture sites.  Philadelphia blade was also used to mobilize the fracture portions.  Curette was used to remove any callus including the bone.  Once we had the fracture mobilized and cleaned we then proceeded to placed a pointed clamp in the distal fibula and carefully pulled traction.  We are then able to reduce the anterolateral fragment like a trapdoor.  We then placed another bone reduction tenaculum to hold that fracture piece and also another bone reduction tenaculum to advance and bring the distal fibula back out to length with the shaft.  Once we brought the fibula back out to length we did note some bone  loss related to the combination.  We utilized cancellous allograft and packed the metaphyseal area.  We then obtained fluoroscopic imaging confirming appropriate reduction and restoration of length utilizing the large C arm.  We then proceeded to place a lag screw through that anterior lateral fragment.  Standard technique utilized drilling with a 3.5 mm drill bit and then the 2.5 mm drill bit.  We then measured and placed the cortical lag screw with appropriate bony purchase noted.  We then selected a hole one third tubular plate Arthrex and contoured it to fit the distal fibula and the shaft.  We then placed the plate on the bone and utilized lobster retractor to secure.  Once we confirmed on x-ray fluoroscopy on AP lateral and oblique x-rays that the plate was appropriately positioned, we then proceeded to place tack to hold the plate.  We then proceeded to place a cortical screw through one of the proximal holes by using the 2.5 mm drill bit.  We then utilized the locking guide to place the most distal locking screw in the plate.  We drilled measured and placed that locking screw.  We then filled in the second hole from the distal portion of the plate with a locking screw.  We then filled and the remaining proximal screws with locking screws.  We kept holes 3 and 4 open in the plate for placement of syndesmotic fixation.  We confirmed with large C-arm fluoroscopy appropriate anatomical alignment of the distal fibula fracture with appropriate length and placement of screws in the plate.  We then turned our attention to the medial malleolus.  We had anatomical reduction of the medial malleolus and so percutaneous fixation was utilized.  We used the guidepin for the 4.0 mm cannulated screw set Arthrex.  Using fluoroscopic imaging we placed the anterior guidewire first.  We then placed the more posterior guidewire second.  We confirmed good positioning of the guidewire on AP and lateral x-rays.  We then made small  incision around the pin entry point with 15 blade and bluntly dissected down to bone.  We then measured each screw length by placing a guide over the guidewires.  We then proceeded with the anterior screw first utilizing the over drilling reamer with copious irrigation.  We then placed the long threaded 4.0 mm cannulated screw 50 mm length with excellent bony purchase and fixation and compression of the fracture.  We then measured and then overdrilled the more posterior guidewire.  We then placed a second along threaded 4.0 mm cannulated screw 50 millimeter length.  Excellent bony purchase was confirmed.  We then removed the guidewires.  The posterior malleolar avulsion fracture was small and did not require any direct fixation.  We then proceeded with open reduction internal fixation of right ankle syndesmosis.  We utilized large bone reduction tenaculum and reduced the syndesmosis making a small stab incision medially to accommodate the prong of the clamp.  We utilized divergent pattern with 2 tight ropes.  1 tight rope was directed more neutrally and 1 was more directed 20 degrees anteriorly while parallel to the tibiotalar joint.  We started with the proximal and first and utilized the guidepin drill guide.  We went through all 4 cortices.  We confirmed good position on x-ray evaluation and then overdrilled.  We then placed the tight rope and flipped the Endobutton to sit flush on the medial distal tibia.  We then held the ankle in neutral dorsiflexion and tightened down the Endobutton into the fibular plate.  We then trimmed the suture.  We then placed our second tight rope in a similar fashion after guidepin placement and over drilling.  Endobutton was flipped to sit flush on the distal medial tibia and the ankle was held in neutral dorsiflexion as we tightened down the lateral button.  Clamp had been removed.  Excellent reduction of the syndesmosis noted.  Syndesmosis now stable on stress testing  fluoroscopically.  We then copiously irrigated all wounds.  We placed vancomycin powder deep in the wounds.  For the lateral wound we then closed the periosteal layer with interrupted 2-0 Vicryl mattress sutures.  We then irrigated and then placed 4-0 Vicryl subdermal sutures.  We then closed the skin with 4-0 nylon.  For the medial wounds we closed the subdermal tissue with interrupted 4-0 Vicryl sutures and then closed the skin with 4-0 nylon sutures.  Tourniquet was released and all toes were pink and warm with distal pulses intact.  Dressing including xeroform, fluffs, ABD's, soft roll and well-padded posterior and sugar-tong splint was applied.  Patient was placed in a well-padded short leg splint and dressing completed with ace wraps.  The patient was transported to the PACU in stable condition.  Patient will be non-weightbearing on their operative ankle with crutches, knee walker or wheelchair.  Patient fulfilled post-procedure discharge criteria and was released home.  Patient and patient representatives were given detailed discharge instructions and home-going medications including Percocet for severe pain only, Zofran, Senna and DVT prophylaxis with Eliquis restarting on POD1.  We discussed with the patient the different medications available for DVT prophylaxis and after discussion of risks and benefits the patient selected the above.  Splint care reviewed and splint will be kept in place until follow-up in the office in 10-14 days.  Findings were discussed with the patient and their representative after the procedure and questions were answered in detail.      Complications:  None; patient tolerated the procedure well.    Disposition: PACU - hemodynamically stable.  Condition: stable   Attending Attestation: I was present and scrubbed for the entire procedure.    Karen Da Silva  Phone Number: [phone]

## 2024-12-13 NOTE — DISCHARGE INSTRUCTIONS
Please remain nonweight bearing on your splint/brace. DO NOT place any weight on your splint/brace. It is important to keep your cast/splint elevated supported at the calf with NO pressure on the heel to reduce swelling.    Please take your medications as instructed. Please resume your home eliquis tomorrow, on 12/14. Please wean yourself off of the Percocet as tolerated. Do NOT take additional tylenol when taking the Percocet (oxycodone-acetaminophen) as the limit for Acetaminophen (aka Tylenol) is 4g daily.     You can call [phone] to schedule your follow up appointment for around 10-14 days after surgery    If you have any concerns or questions; please call our office at [phone].

## 2024-12-13 NOTE — BRIEF OP NOTE
Date: 2024  OR Location: [facility] OR    Name: Emy Potts, : 1950, Age: 74 y.o., MRN: [id number], Sex: female    Diagnosis  Pre-op Diagnosis      * Closed trimalleolar fracture of right ankle, initial encounter [S82.851A]     * Ankle syndesmosis disruption, right, initial encounter [S93.431A] Post-op Diagnosis     * Closed trimalleolar fracture of right ankle, initial encounter [S82.851A]     * Ankle syndesmosis disruption, right, initial encounter [S93.431A]     Procedures  Right ankle scope with extensive debridement, right ankle ORIF of trimalleolar ankle fracture (Arthrex 1/3rd tubular plate 8 hole with 3.5mm lag and screws, 4.0mm cannulated screws), right ankle ORIF of syndesmosis (Arthrex tightrope syndesmotic x 2 divergent)    Surgeons      * Karen Da Silva - Primary    Resident/Fellow/Other Assistant:  Surgeons and Role:     * Meena Phillips DO - Resident - Assisting    Staff:   Circulator: Juve Ba Person: Chance    Anesthesia Staff: Anesthesiologist: Panda Montalvo MD  C-AA: YARY Scott; YARY Alfaro    Procedure Summary  Anesthesia: General  ASA: III  Estimated Blood Loss: 10mL  Intra-op Medications:   Administrations occurring from 0730 to 1200 on 24:   Medication Name Total Dose   oxygen (O2) therapy 42 L   ceFAZolin (Ancef) vial 1 g 2 g   dexAMETHasone (Decadron) 4 mg/mL 4 mg   fentaNYL PF 0.05 mg/mL 150 mcg   glycopyrrolate PF (Robinul) injection 0.4 mg   LR bolus Cannot be calculated   lidocaine (Xylocaine) injection 2 % 60 mg   midazolam (Versed) 1 mg/1 mL 2 mg   ondansetron 2 mg/mL 4 mg   phenylephrine (Oscar-Synephrine) 10 mg/250 mL NS (40 mcg/mL) infusion 0.53 mg   phenylephrine 100 mcg/mL syringe 10 mL (prefilled) 200 mcg   propofol (Diprivan) injection 10 mg/mL 150 mg   rocuronium (ZeMuron) 50 mg/5 mL injection 100 mg   ropivacaine PF (Naropin) 0.5 % 50 mL   sugammadex (Bridion) 200 mg/2 mL injection 200 mg              Anesthesia Record                Intraprocedure I/O Totals          Intake    LR bolus 750.00 mL    Phenylephrine Drip 0.00 mL    The total shown is the total volume documented since Anesthesia Start was filed.    Total Intake 750 mL       Output    Est. Blood Loss 10 mL    Total Output 10 mL       Net    Net Volume 740 mL          Specimen: No specimens collected       Findings: trimalleolar ankle fracture with small, irreparable Chaput fragment. Posterior malleolus fracture <5% of articular surface; lateral malleolus fracture with shortening and bone loss    Complications:  None; patient tolerated the procedure well.     Disposition: PACU - hemodynamically stable.  Condition: stable  Specimens Collected: No specimens collected    Postop Plan:   Patient taken to PACU in stable condition. Well perfused toes. Unable to assess sensory or motors secondary to preoperative block.     Patient to be discharged home today. She will be discharged nonweightbearing in her splint, with script for pain medications, stool softener, anti-emetics and to resume her home Eliquis POD1.     Attending Attestation: I was present and scrubbed for the entire procedure.    Karen Da Silva  Phone Number: [phone]

## 2024-12-13 NOTE — ANESTHESIA PROCEDURE NOTES
Airway  Date/Time: 12/13/2024 7:52 AM  Urgency: elective    Airway not difficult    Staffing  Performed: YARY   Authorized by: Panda Montalvo MD    Performed by: YARY Alfaro  Patient location during procedure: OR    Indications and Patient Condition  Indications for airway management: anesthesia and airway protection  Spontaneous Ventilation: absent  Sedation level: deep  Preoxygenated: yes  Patient position: sniffing  MILS not maintained throughout  Mask difficulty assessment: 1 - vent by mask  Planned trial extubation    Final Airway Details  Final airway type: endotracheal airway      Successful airway: ETT  Cuffed: yes   Successful intubation technique: direct laryngoscopy  Endotracheal tube insertion site: oral  Blade: Adriana  Blade size: #3  ETT size (mm): 7.0  Cormack-Lehane Classification: grade I - full view of glottis  Placement verified by: chest auscultation, capnometry and palpation of cuff   Measured from: teeth  ETT to teeth (cm): 22  Number of attempts at approach: 1

## 2024-12-13 NOTE — H&P
History Of Present Illness  Emy Potts is a 74 y.o. female presenting with right ankle trimalleolar fracture. She presents today for operative fixation.     Past Medical History  She has a past medical history of Acute left ankle pain (01/08/2024), Acute pain due to trauma (01/08/2024), Biceps tendinitis of right upper extremity (03/29/2024), Complication of surgical procedure (04/29/2024), Contracture of left shoulder (08/07/2020), History of hypertension (06/10/2024), History of infectious disease (06/10/2024), History of subdural hematoma (08/07/2020), Injury of tendon of long head of biceps, left, initial encounter (08/07/2020), Joint stiffness (04/29/2024), Low back pain (04/29/2024), Muscle weakness (04/29/2024), Pain in left shoulder (08/20/2020), Palpitations (06/10/2024), Personal history of other diseases of the circulatory system (01/14/2014), Personal history of other diseases of the circulatory system (07/01/2020), Personal history of other diseases of the musculoskeletal system and connective tissue (06/26/2018), Personal history of other infectious and parasitic diseases (07/01/2020), Personal history of other specified conditions (06/26/2018), Personal history of pneumonia (recurrent) (07/17/2018), Unspecified asthma, uncomplicated ([facility]) (03/19/2017), Urinary tract infection (06/10/2024), and Weakness (06/10/2024).    Surgical History  She has a past surgical history that includes Back surgery (01/14/2014); Neck surgery (01/14/2014); Other surgical history (01/14/2014); Other surgical history (07/01/2020); Other surgical history (07/15/2022); MR angio head wo IV contrast (8/2/2019); MR angio neck wo IV contrast (8/2/2019); and CT angio head w and wo IV contrast (8/18/2019).     Social History  She reports that she has quit smoking. Her smoking use included cigarettes. She has never used smokeless tobacco. She reports that she does not currently use alcohol. She reports that she does not use  "drugs.    Family History  No family history on file.     Allergies  Allerg xt,d.farinae-d.pteronys and Cat hair standardized allergenic extract    Review of Systems   All other systems reviewed and are negative.    Physical Exam   - Constitutional: no acute distress, alert, cooperative  - Eyes: anicteric  - Head/Neck: normocephalic, atraumatic  - Respiratory/Thorax: normal work of breathing  - Cardiovascular: extremities warm and well perfused  - Gastrointestinal: non-distended  - Psychological: appropriate mood/behavior  - Skin: warm and dry; additional findings in musculoskeletal evaluation  - Musculoskeletal:  ---  Right Lower Extremity:   -Splint dry and intact.  -Fires DF/PF/EHL/FHL  -SILT in saph/sural/SPN/DPN distributions  -Foot warm, well perfused  -Palpable DP pulse, brisk cap refill  -Compartments soft and compressible   Last Recorded Vitals  Blood pressure 156/74, pulse 59, temperature 36.4 °C (97.5 °F), temperature source Temporal, resp. rate 16, height 1.651 m (5' 5\"), weight 65.8 kg (145 lb), SpO2 99%.    Relevant Results      Scheduled medications  ceFAZolin, 2 g, intravenous, Once      Continuous medications     PRN medications    No results found for this or any previous visit (from the past 24 hours).    Assessment/Plan   Assessment & Plan  Closed trimalleolar fracture of right ankle    Ankle syndesmosis disruption, right, initial encounter      Plan for Right ankle arthroscopy with debridement, ORIF R trimalleolar ankle fracture.            Meena Phillips DO    "

## 2024-12-13 NOTE — ANESTHESIA PROCEDURE NOTES
Peripheral IV  Date/Time: 12/13/2024 8:00 AM      Placement  Needle size: 20 G  Laterality: left  Location: forearm  Local anesthetic: none  Site prep: alcohol  Technique: anatomical landmarks  Attempts: 1

## 2024-12-13 NOTE — ANESTHESIA PROCEDURE NOTES
Peripheral Block    Patient location during procedure: pre-op  Start time: 12/13/2024 7:32 AM  End time: 12/13/2024 7:33 AM  Reason for block: at surgeon's request and post-op pain management  Staffing  Performed: attending   Authorized by: Panda Montalvo MD    Performed by: Panda Montalvo MD  Preanesthetic Checklist  Completed: patient identified, IV checked, site marked, risks and benefits discussed, surgical consent, monitors and equipment checked, pre-op evaluation and timeout performed   Timeout performed at:   Peripheral Block  Patient position: laying flat  Prep: ChloraPrep and site prepped and draped  Patient monitoring: continuous pulse ox, heart rate and cardiac monitor  Block type: adductor canal  Laterality: right  Injection technique: single-shot  Guidance: ultrasound guided  Local infiltration: lidocaine  Infiltration strength: 1 %  Dose: 3 mL  Needle  Needle type: short-bevel   Needle gauge: 22 G  Needle length: 8 cm  Needle localization: ultrasound guidance (Image saved in chart)  Test dose: negative  Assessment  Injection assessment: negative aspiration for heme, local visualized surrounding nerve on ultrasound, no paresthesia on injection and incremental injection  Heart rate change: no  Slow fractionated injection: yes  Additional Notes

## 2024-12-13 NOTE — PERIOPERATIVE NURSING NOTE
1123 Patient arrived to [facility] after procedure with Anesthesia and procedure RN, procedure discussed, plan reviewed, VSS    1125 pt spouse updated via secure text    1135 pt tolerating apple juice    1145 Dr Da Silva at bedside    1200 Patient tolerating crackers at this time.     1215 phase 2    1220 pt spouse at bedside, RN had long talk with spouse in private, he has great concern about pt returning home being complete non weight bearing status.  Pt right arm paralyzed making a walker not an option and wheel chair they have for home use does not fit into their restroom. MD messaged with spouse concern    1245 order for bedside commode placed. Pt spouse given number for  DME supplies    1300 handoff given to ALONZO Roman RN

## 2024-12-13 NOTE — ANESTHESIA POSTPROCEDURE EVALUATION
Patient: Emy Potts    Procedure Summary       Date: 12/13/24 Room / Location: [facility] 18 / Virtual [facility]    Anesthesia Start: 0742 Anesthesia Stop: 1128    Procedure: Right Ankle Arthroscopy; Extensive Debridement; Trimalleolar Fracture Open Reduction Internal Fixation; Syndesmosis Fracture Open Reduction Internal Fixation (Right: Ankle) Diagnosis:       Closed trimalleolar fracture of right ankle, initial encounter      Ankle syndesmosis disruption, right, initial encounter      (Closed trimalleolar fracture of right ankle, initial encounter [S82.851A])      (Ankle syndesmosis disruption, right, initial encounter [S93.431A])    Surgeons: Karen Da Silva MD Responsible Provider: Panda Montalvo MD    Anesthesia Type: general ASA Status: 3            Anesthesia Type: general    Vitals Value Taken Time   /68 12/13/24 1214   Temp 37.2 °C (99 °F) 12/13/24 1214   Pulse 60 12/13/24 1214   Resp 15 12/13/24 1214   SpO2 96 % 12/13/24 1214       Anesthesia Post Evaluation    Patient location during evaluation: bedside  Patient participation: complete - patient participated  Level of consciousness: awake  Pain management: adequate  Multimodal analgesia pain management approach  Airway patency: patent  Cardiovascular status: stable  Respiratory status: spontaneous ventilation and unassisted  Hydration status: acceptable  Postoperative Nausea and Vomiting: none  Comments: No significant PONV.        No notable events documented.

## 2024-12-23 ENCOUNTER — APPOINTMENT (OUTPATIENT)
Dept: PRIMARY CARE | Facility: CLINIC | Age: 74
End: 2024-12-23
Payer: COMMERCIAL

## 2024-12-27 ENCOUNTER — APPOINTMENT (OUTPATIENT)
Dept: ORTHOPEDIC SURGERY | Facility: CLINIC | Age: 74
End: 2024-12-27
Payer: COMMERCIAL

## 2024-12-27 DIAGNOSIS — S82.851A CLOSED TRIMALLEOLAR FRACTURE OF RIGHT ANKLE, INITIAL ENCOUNTER: ICD-10-CM

## 2024-12-27 PROCEDURE — 99024 POSTOP FOLLOW-UP VISIT: CPT | Performed by: ORTHOPAEDIC SURGERY

## 2024-12-27 RX ORDER — OXYCODONE AND ACETAMINOPHEN 5; 325 MG/1; MG/1
1 TABLET ORAL EVERY 6 HOURS PRN
Qty: 28 TABLET | Refills: 0 | Status: SHIPPED | OUTPATIENT
Start: 2024-12-27 | End: 2025-01-03

## 2024-12-27 NOTE — PROGRESS NOTES
This is a pleasant 74 y.o. year old female who presents for fuv of right ankle.  She has been NWB.  Finished up her percocet prescription- she takes one tablet 2-3 times a day.  She will continue to work on weaning off of the medication.  She is out of the percocet and requests refill.    PHYSICAL EXAMINATION  Constitutional Exam: patient's height and weight reviewed, well-kempt  Psychiatric Exam: alert and oriented x 3, appropriate mood and behavior  Eye Exam: KSENIA, EOMI  Pulmonary Exam: breathing non-labored, no apparent distress  Lymphatic exam: no appreciable lymphadenopathy in the lower extremities  Cardiovascular exam: DP pulses 2+ bilaterally, PT 2+ bilaterally, toes are pink with good capillary refill, no pitting edema  Skin exam: no open lesions, rashes, abrasions or ulcerations  Neurological exam: sensation to light touch intact in both lower extremities in peripheral and dermatomal distributions (except for any abnormalities noted in musculoskeletal exam)    Musculoskeletal exam: right ankle: swelling decreased, wounds healing well, calf soft and supple, wiggles toes    DATA/RESULTS REVIEW: I reviewed the patient's intra-operative films of her ankle and hardware and answered patient's and her 's questions.     ASSESSMENT: Right ankle scope with extensive debridement, right ankle ORIF of trimalleolar ankle fracture, right ankle ORIF of syndesmosis 12/13/24, hx of CKD   PLAN: Further treatment options discussed including transition into well padded nonweightbearing fiberglass cast, which was applied today in the office. Cast care reviewed, continue non-weightbearing.  She will continue with eliquis BID; instructed patient to not take ibuprofen when she is on eliquis.  I personally obtained and reviewed patient's OARRS report prior to prescribing narcotic pain medication.  Percocet prescription was refilled and we discussed with the patient and her  some strategies to slowly come off of the  narcotic medication.  Follow-up evaluation in approximately 1 to 2 weeks for suture removal.  Showed her initial therapeutic exercise program to do at home.  Discussed with her that she will have to be nonweightbearing at least 6 weeks but sometimes up to 8 weeks.  The patient's questions were answered in detail.      Note dictated with [employer] software, completed without full type editing to avoid delay.

## 2024-12-29 DIAGNOSIS — I10 BENIGN ESSENTIAL HYPERTENSION: ICD-10-CM

## 2024-12-30 RX ORDER — OLMESARTAN MEDOXOMIL 20 MG/1
20 TABLET ORAL DAILY
Qty: 90 TABLET | Refills: 0 | Status: SHIPPED | OUTPATIENT
Start: 2024-12-30

## 2024-12-31 ENCOUNTER — HOME CARE VISIT (OUTPATIENT)
Dept: HOME HEALTH SERVICES | Facility: HOME HEALTH | Age: 74
End: 2024-12-31

## 2025-01-13 ENCOUNTER — OFFICE VISIT (OUTPATIENT)
Dept: ORTHOPEDIC SURGERY | Facility: HOSPITAL | Age: 75
End: 2025-01-13
Payer: COMMERCIAL

## 2025-01-13 DIAGNOSIS — S82.851A CLOSED TRIMALLEOLAR FRACTURE OF RIGHT ANKLE, INITIAL ENCOUNTER: ICD-10-CM

## 2025-01-13 PROCEDURE — L4361 PNEUMA/VAC WALK BOOT PRE OTS: HCPCS | Performed by: ORTHOPAEDIC SURGERY

## 2025-01-13 PROCEDURE — 99211 OFF/OP EST MAY X REQ PHY/QHP: CPT | Performed by: ORTHOPAEDIC SURGERY

## 2025-01-13 PROCEDURE — 1159F MED LIST DOCD IN RCRD: CPT | Performed by: ORTHOPAEDIC SURGERY

## 2025-01-13 NOTE — PROGRESS NOTES
Patient comes in s/p Right Ankle Arthroscopy; Extensive Debridement; Trimalleolar Fracture Open Reduction Internal Fixation; Syndesmosis Fracture Open Reduction Internal Fixation - Right Her pain is minimal, taking DVT prophylaxis.    Physical Exam:  Right ankle : incisions healed and sutures removed and steristrips applied, calf soft and supple, toes pink and warm with good capillary refill, pulses intact, limb swelling appropriate, wiggles toes    Assessment: Right ankle scope with extensive debridement, right ankle ORIF of trimalleolar ankle fracture, right ankle ORIF of syndesmosis 12/13/24     Plan:  - Weightbearing instructions and restrictions discussed with patient, continue NWB  - Placed in tall cam walker boot, instructed patient to use like a cast and only remove to do her therapy exercises that we showed her TID and only remove for skin cleansing  - DVT prophylaxis continue  - follow-up visit 2 weeks, xrays AP/lat/ob right ankle at next visit to assess fracture healing  - PT orders placed, instructed patient to call ahead to schedule appointment to start after next visit  Patient's questions were answered in detail and they are agreeable to above plan.

## 2025-02-03 ENCOUNTER — OFFICE VISIT (OUTPATIENT)
Dept: ORTHOPEDIC SURGERY | Facility: HOSPITAL | Age: 75
End: 2025-02-03
Payer: COMMERCIAL

## 2025-02-03 ENCOUNTER — HOSPITAL ENCOUNTER (OUTPATIENT)
Dept: RADIOLOGY | Facility: HOSPITAL | Age: 75
Discharge: HOME | End: 2025-02-03
Payer: COMMERCIAL

## 2025-02-03 DIAGNOSIS — S82.851D CLOSED TRIMALLEOLAR FRACTURE OF RIGHT ANKLE, WITH ROUTINE HEALING, SUBSEQUENT ENCOUNTER: Primary | ICD-10-CM

## 2025-02-03 DIAGNOSIS — S82.851A CLOSED TRIMALLEOLAR FRACTURE OF RIGHT ANKLE, INITIAL ENCOUNTER: ICD-10-CM

## 2025-02-03 PROCEDURE — 73610 X-RAY EXAM OF ANKLE: CPT | Mod: RT

## 2025-02-03 PROCEDURE — 99211 OFF/OP EST MAY X REQ PHY/QHP: CPT | Performed by: ORTHOPAEDIC SURGERY

## 2025-02-03 PROCEDURE — 73610 X-RAY EXAM OF ANKLE: CPT | Mod: RIGHT SIDE

## 2025-02-03 NOTE — PROGRESS NOTES
This is a pleasant 74 y.o. year old female who presents for fuv of right ankle.  She is doing well, no pain, on DVT prophylaxis.    PHYSICAL EXAMINATION  Cardiovascular exam: DP pulses 2+ bilaterally, PT 2+ bilaterally, toes are pink with good capillary refill, no pitting edema  Skin exam: no open lesions, rashes, abrasions or ulcerations  Neurological exam: sensation to light touch intact in both lower extremities in peripheral and dermatomal distributions (except for any abnormalities noted in musculoskeletal exam)  Musculoskeletal exam: right ankle: wounds healed, good ROM of ankle and ST joint, stable ligamentous exam, no ankle effusion, motor str improved    DATA/RESULTS REVIEW: I personally reviewed the patient's x-ray images and reports of the right ankle showing healed fractures and appropriate anatomic alignment of ankle, intact hardware in appropriate position.     ASSESSMENT: Right ankle scope with extensive debridement, right ankle ORIF of trimalleolar ankle fracture, right ankle ORIF of syndesmosis 12/13/24   PLAN: Further treatment options discussed including progress at PT to WBAT in boot, updated PT orders.  Cont vit D.  Use plantar fascial night splint at night time for 4-6 weeks while sleeping to prevent gastrocsoleus contracture.  FUV in 2-3 months.  The patient's questions were answered in detail.      Note dictated with [email or web address] software, completed without full type editing to avoid delay.

## 2025-02-26 ENCOUNTER — APPOINTMENT (OUTPATIENT)
Dept: PHYSICAL THERAPY | Facility: CLINIC | Age: 75
End: 2025-02-26
Payer: COMMERCIAL

## 2025-03-03 ENCOUNTER — APPOINTMENT (OUTPATIENT)
Dept: RADIOLOGY | Facility: CLINIC | Age: 75
End: 2025-03-03
Payer: COMMERCIAL

## 2025-03-04 ENCOUNTER — EVALUATION (OUTPATIENT)
Dept: PHYSICAL THERAPY | Facility: CLINIC | Age: 75
End: 2025-03-04
Payer: COMMERCIAL

## 2025-03-04 DIAGNOSIS — S82.851D CLOSED TRIMALLEOLAR FRACTURE OF RIGHT ANKLE, WITH ROUTINE HEALING, SUBSEQUENT ENCOUNTER: ICD-10-CM

## 2025-03-04 DIAGNOSIS — M25.571 RIGHT ANKLE PAIN: Primary | ICD-10-CM

## 2025-03-04 PROCEDURE — 97110 THERAPEUTIC EXERCISES: CPT | Mod: GP

## 2025-03-04 PROCEDURE — 97161 PT EVAL LOW COMPLEX 20 MIN: CPT | Mod: GP

## 2025-03-04 ASSESSMENT — ENCOUNTER SYMPTOMS
DEPRESSION: 0
LOSS OF SENSATION IN FEET: 0
OCCASIONAL FEELINGS OF UNSTEADINESS: 1

## 2025-03-04 ASSESSMENT — PAIN SCALES - GENERAL: PAINLEVEL_OUTOF10: 0 - NO PAIN

## 2025-03-04 NOTE — PROGRESS NOTES
Physical Therapy  Physical Therapy Orthopedic Evaluation    Patient Name: Emy Potts  MRN: [id number]  Encounter Date: 3/4/2025  Time Calculation  Start Time: 1415  Stop Time: 1500  Time Calculation (min): 45 min    Insurance:  Visit number: 1  Approved number of visits: MN  Insurance: [employer]  Medicare cert date 3/4/25  to 6/1/25    General:  Reason for visit: right ankle rx  Referred by: aurelio    Current Problem  1. Right ankle pain        2. Closed trimalleolar fracture of right ankle, with routine healing, subsequent encounter  Referral to Physical Therapy    Follow Up In Physical Therapy            General  Reason for Referral: right ankle pain  Referred By: aurelio  Precautions  Precautions  STEADI Fall Risk Score (The score of 4 or more indicates an increased risk of falling): 6  Precautions Comment: falls  Pain  0-10 (Numeric) Pain Score: 0 - No pain      Subjective:     Chief Complaint: ankle Fx    Slipped at home foot got caught on side table. Fractured ankle.  Injury was on 12/1/24.   surgery was 12/13/24.    Was in cast off 6 weeks ago in boot 4 weeks and is able to walk WBAT  Onset: 12/13/24  SHENG:  fall      PMH: scoliosis surgery, L ankle fx healed, TSA x 2 with resulting R UE 'palsy' per pt report and pt is right handed.     Current Condition:   better     PAIN  increases pain/difficulty:  moving walking, strength, balance    Intensity (0-10): current 0,       Relevant Information (PMH & Previous Tests/Imaging): xrays  Previous Interventions/Treatments: home PT/OT    Current level of function/exercise: none  Prior Level of Function (PLOF)  Patient previously independent with all ADLs  Exercise/Physical Activity: TM walking  Work/School: retied       Work status: retired    Living situation   - apartment elevator   - lives with    - reviewed and no concern  Personal factors Impacting care:   - language: ENG    Pt stated goal(s) walk      Medical History Form: Reviewed (scanned into  chart)    Red Flags: Do you have any of the following? none  Fever/chills, unexplained weight changes, dizziness/fainting, unexplained change in bowel or bladder functions, unexplained malaise or muscle weakness, night pain/sweats, numbness or tingling    Problem list:  Decreased flexibility, Decreased strength, and Decreased knowledge of HEP     Objective:  Ankle ROM  DF 7/7  PF 52/58  IV  38/40  EV 13/25    Ankle MMT  DF 5/5  PF 5/5  IV 5/5  EV 5/ 5    Arrived in WC with boot.  Amb WBAT in boot from chair to table.      Ankle mild swelling, scars well healed almost indiscernible         Outcome Measures:  Other Measures  Lower Extremity Funtional Score (LEFS): 19     EDUCATION: home exercise program, plan of care, activity modifications, pain management, and injury pathology      HEP: Performed and provided:  Access Code: [email or web address]  URL: [email or web address]  Date: 03/04/2025  Prepared by: Renetta Flores    Exercises  - Isometric Ankle Inversion  - 1 x daily - 7 x weekly - 2-3 sets - 10 reps - 2-3 sec hold  - Seated Ankle Eversion with Resistance  - 1 x daily - 7 x weekly - 2-3 sets - 10 reps  - Seated Ankle Plantar Flexion with Resistance Loop  - 1 x daily - 7 x weekly - 2-3 sets - 10 reps  - Seated Ankle Dorsiflexion with Anchored Resistance  - 1 x daily - 7 x weekly - 2-3 sets - 10 reps  - Seated Toe Raise  - 1 x daily - 7 x weekly - 2-3 sets - 10 reps  - Seated Heel Raise  - 1 x daily - 7 x weekly - 2-3 sets - 10 reps  - Seated Great Toe Extension  - 1 x daily - 7 x weekly - 1-2 sets - 20 reps  - Seated Lesser Toes Extension  - 1 x daily - 7 x weekly - 1-2 sets - 20 reps    Charges: eval x 1 te x 1  Level of Eval Complexity:  low  Clinical Presentation:   Stable and/or uncomplicated characteristics,       Assessment: Patient is a 73 yo f with s/p ORIF 12/13/24 R.   Pt presents with signs and symptoms consistent with surgery, resulting in limited participation in pain-free ADLs and  inability to perform at their prior level of function. Pt would benefit from physical therapy to address the impairments found & listed previously in the objective section in order to return to safe and pain-free ADLs and prior level of function.    Prognosis: Good   Plan:     Planned Interventions include: therapeutic exercise, self-care home management, manual therapy, therapeutic activities, gait training, neuromuscular coordination, aquatic therapy  Frequency: 1 time a week  Duration: 6-8 weeks    Goals:  Active       PT Problem       STG       Start:  03/04/25    Expected End:  03/18/25       Independent HEP by 2 weeks         LTG       Start:  03/04/25    Expected End:  05/03/25       MMT 5/5 BLE by 6 weeks  Amb normal gait >150' to safely amb in house by 6-8 weeks  Reciprocal stairs with ease by 4-6 weeks  Stand to perform ADLs without increased pain by 4-6 weeks  Tandem stance > 20 sec without LOB by 6-8 weeks  LEFS improved by 20 points by 6-8 weeks               Plan of Care was developed with input and agreement by the patient.

## 2025-03-11 ENCOUNTER — APPOINTMENT (OUTPATIENT)
Dept: PHYSICAL THERAPY | Facility: CLINIC | Age: 75
End: 2025-03-11
Payer: COMMERCIAL

## 2025-03-18 ENCOUNTER — APPOINTMENT (OUTPATIENT)
Dept: PHYSICAL THERAPY | Facility: CLINIC | Age: 75
End: 2025-03-18
Payer: COMMERCIAL

## 2025-03-20 ENCOUNTER — APPOINTMENT (OUTPATIENT)
Dept: PRIMARY CARE | Facility: CLINIC | Age: 75
End: 2025-03-20
Payer: COMMERCIAL

## 2025-03-31 ENCOUNTER — HOSPITAL ENCOUNTER (OUTPATIENT)
Dept: RADIOLOGY | Facility: HOSPITAL | Age: 75
Discharge: HOME | End: 2025-03-31
Payer: COMMERCIAL

## 2025-03-31 ENCOUNTER — OFFICE VISIT (OUTPATIENT)
Dept: ORTHOPEDIC SURGERY | Facility: HOSPITAL | Age: 75
End: 2025-03-31
Payer: COMMERCIAL

## 2025-03-31 DIAGNOSIS — S82.851A CLOSED TRIMALLEOLAR FRACTURE OF RIGHT ANKLE, INITIAL ENCOUNTER: ICD-10-CM

## 2025-03-31 PROCEDURE — 73610 X-RAY EXAM OF ANKLE: CPT | Mod: RT

## 2025-03-31 PROCEDURE — 73610 X-RAY EXAM OF ANKLE: CPT | Mod: RIGHT SIDE | Performed by: RADIOLOGY

## 2025-03-31 PROCEDURE — 99213 OFFICE O/P EST LOW 20 MIN: CPT | Performed by: ORTHOPAEDIC SURGERY

## 2025-03-31 NOTE — PROGRESS NOTES
This is a pleasant 74 y.o. year old female who presents for fuv of right ankle.  She is doing well, no pain. Doing PT and HEP. WBAT in boot when out of house.  She uses regular shoe when she is at home.    PHYSICAL EXAMINATION  Cardiovascular exam: DP pulses 2+ bilaterally, PT 2+ bilaterally, toes are pink with good capillary refill, no pitting edema  Skin exam: no open lesions, rashes, abrasions or ulcerations  Neurological exam: sensation to light touch intact in both lower extremities in peripheral and dermatomal distributions (except for any abnormalities noted in musculoskeletal exam)  Musculoskeletal exam: right ankle: wounds healed, good ROM of ankle and ST joint, stable ligamentous exam, no ankle effusion, motor str improved    DATA/RESULTS REVIEW: I personally reviewed the patient's x-ray images and reports of the right ankle showing healed fractures and appropriate anatomic alignment of ankle, intact hardware in appropriate position.     ASSESSMENT: Right ankle scope with extensive debridement, right ankle ORIF of trimalleolar ankle fracture, right ankle ORIF of syndesmosis 12/13/24   PLAN: Further treatment options discussed including progress at PT to WBAT in boot, updated PT orders.  Cont vit D. Progress from boot at PT. Patient was given a handout and instructed on an at home stretching program.  They should do these exercises 3 times per day for 6 weeks and then daily. Patient can use OTC Voltaren gel, biofreeze or  aspercream for pain and continue to ice and elevate supported at the calf to reduce swelling . All the patient's questions were answered. The patient agrees with the above plan.  Follow up in 1 year with repeat right ankle xrays with AP, lateral and oblique views to evaluate bone healing. Note dictated with [employer] software, completed without full type editing to avoid delay.    I personally saw and evaluated the patient. I personally obtained the key and critical  portions of the history and physical exam or was physically present for key and critical portions performed by the Rubio MATUTE-ANJU. I reviewed the PA-C 's documentation and discussed the patient with the PA-C. I agree with the PA-C's medical decision making as documented in the note.    Karen Da Silva MD

## 2025-03-31 NOTE — PATIENT INSTRUCTIONS
YOUR BOOT INSTRUCTIONS:  You can put weight on your foot and ankle while in the boot.    You can use cane for support as needed.   You should wear boot when walking or standing   You can take off your boot while bathing, sitting, stretching, icing or doing therapy exercises.  You can take your boot off at nighttime while sleeping.    BOOT WEANING:  DAY 1-- come out of boot for 1 hour (wear supportive athletic shoes and orthotic inserts), rest of the day in boot  DAY 2-- come out of boot for 2 hours (wear supportive athletic shoes and orthotic inserts), rest of the day in boot  DAY 3-- come out of boot for 3 hours (wear supportive athletic shoes and orthotic inserts), rest of the day in boot  DAY 4-- come out of boot for 4 hours (wear supportive athletic shoes and orthotic inserts), rest of the day in boot  DAY 5-- come out of boot and wear supportive shoes and orthotic inserts  * if you have pain while weaning out of boot then go back one day in the protocol (i.e. If really sore on the morning of Day 3 from coming out of boot for 2 hours the previous day, go back to Day 1 and start again through the protocol)    You can also use OTC Voltaren gel or  aspercreme ointment and apply it to the injured area.      Ice and elevate supported at the calf to reduce swelling    The patient was given a prescription for physical therapy.  Physical therapy is medically necessary to improve strength, balance, range of motion and functional outcomes after injury and/or surgery.    Patient will increase their dietary calcium intake (milk,yogurt) and should get 1200 mg of calcium per day. They will also take a vitamin D supplement.    Follow up in 1 year     volume resuscitation/emergency venous access

## 2025-04-01 NOTE — PROGRESS NOTES
Physical Therapy    Physical Therapy Treatment    Patient Name: Emy Potts  MRN: [id number]  Today's Date: 4/2/2025         Insurance:  Visit number:  2 out of   Approved number of visits: MN  Insurance: [place]  Medicare cert date 3/4/25  to 6/1/25     General:  Reason for visit: right ankle rx  Referred by: aurelio    Current Problem:   1. Right ankle pain        2. Closed trimalleolar fracture of right ankle, with routine healing, subsequent encounter            SUBJECTIVE:   Pt reports     Precautions:         Pain:        OBJECTIVE:        Treatments:  Therapeutic Exercise;     HEP:      ASSESSMENT:   Pt tolerated session well with no adverse reactions. Session consisted of therapeutic exercise. Pt required demonstration and VC during INTERVENTION. Pt will continue to benefit from therapy and progress towards goals.       Charges:       PLAN:  Continue to progress LE/UE strengthening/ROM/stability/balance in upcoming treatment sessions.  TE to perform in next visit: nato RAMIREZ, S-PTA

## 2025-04-02 ENCOUNTER — APPOINTMENT (OUTPATIENT)
Dept: PHYSICAL THERAPY | Facility: CLINIC | Age: 75
End: 2025-04-02
Payer: COMMERCIAL

## 2025-04-07 ENCOUNTER — APPOINTMENT (OUTPATIENT)
Dept: PHYSICAL THERAPY | Facility: CLINIC | Age: 75
End: 2025-04-07
Payer: COMMERCIAL

## 2025-04-07 ENCOUNTER — DOCUMENTATION (OUTPATIENT)
Dept: PHYSICAL THERAPY | Facility: CLINIC | Age: 75
End: 2025-04-07
Payer: COMMERCIAL

## 2025-04-07 DIAGNOSIS — M25.571 RIGHT ANKLE PAIN: Primary | ICD-10-CM

## 2025-04-07 NOTE — PROGRESS NOTES
Discharge Summary    Name: Emy Potts  MRN: [id number]  : 1950  Date: 25    Discharge Summary: PT    Discharge Information: Date of discharge 25, Date of last visit eval , Date of evaluation 3/4/25, and Number of attended visits 1    Therapy Summary: ankle ORIF    Discharge Status: unknown patient cancelled all follow up visits and only attended evaluation      Rehab Discharge Reason: Attendance inconsistent and Failed to schedule and/or keep follow-up appointment(s)

## 2025-05-23 ENCOUNTER — APPOINTMENT (OUTPATIENT)
Dept: PHYSICAL THERAPY | Facility: CLINIC | Age: 75
End: 2025-05-23
Payer: COMMERCIAL

## 2025-05-28 ENCOUNTER — APPOINTMENT (OUTPATIENT)
Dept: PHYSICAL THERAPY | Facility: CLINIC | Age: 75
End: 2025-05-28
Payer: COMMERCIAL

## 2025-06-02 ENCOUNTER — APPOINTMENT (OUTPATIENT)
Dept: OCCUPATIONAL THERAPY | Facility: CLINIC | Age: 75
End: 2025-06-02
Payer: COMMERCIAL

## 2025-06-03 ENCOUNTER — TELEPHONE (OUTPATIENT)
Dept: CARDIOLOGY | Facility: CLINIC | Age: 75
End: 2025-06-03
Payer: COMMERCIAL

## 2025-06-03 ENCOUNTER — APPOINTMENT (OUTPATIENT)
Dept: PHYSICAL THERAPY | Facility: CLINIC | Age: 75
End: 2025-06-03
Payer: COMMERCIAL

## 2025-06-03 NOTE — TELEPHONE ENCOUNTER
Pt called back and I advised her to come in for monitor and EKG however she wanted to wait to talk to Dr Rajan tomorrow.

## 2025-06-03 NOTE — TELEPHONE ENCOUNTER
of patient of Dr. Rajan called.  He stated his wife is complaining of palpitations and feeling heart beat in her neck.  He placed her on pulse ox and heart rate is variable.  It ranges from 98 to as high as 145.  Patient denies any complaints of dyspnea, pain, dizziness, or diaphoresis. I did advise them to go to ER if her symptoms worsen.  Please advise.

## 2025-06-06 ENCOUNTER — EVALUATION (OUTPATIENT)
Dept: OCCUPATIONAL THERAPY | Facility: CLINIC | Age: 75
End: 2025-06-06
Payer: COMMERCIAL

## 2025-06-06 DIAGNOSIS — Z96.619: Primary | ICD-10-CM

## 2025-06-06 PROCEDURE — 97110 THERAPEUTIC EXERCISES: CPT | Mod: GO | Performed by: OCCUPATIONAL THERAPIST

## 2025-06-06 PROCEDURE — 97166 OT EVAL MOD COMPLEX 45 MIN: CPT | Mod: GO | Performed by: OCCUPATIONAL THERAPIST

## 2025-06-06 NOTE — PROGRESS NOTES
Occupational Therapy  Occupational Therapy Orthopedic Evaluation    Patient Name: Emy Potts  MRN: [id number]  Encounter Date: 6/6/2025  Time Calculation  Start Time: 0300  Stop Time: 0345  Time Calculation (min): 45 min    Today's Charges:  OT Evaluation Time Entry  OT Evaluation (Moderate) Time Entry: 30  OT Therapeutic Procedures Time Entry  Therapeutic Exercise Time Entry: 15                 Total Timed Code Time:     Insurance:  Visit count could not be calculated. Make sure you are using a visit which is associated with an episode.  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 Initial surgery was July 3, 2024 and with revision Oct 3, 2024.  Reported radial weakness apparent after discontinuation of sling.  H/o low BP   Has appt with Dr Gonsalez  August 11     Referred by: MELIZA Gomez-ZOHRA    Medical History Form: Reviewed (scanned into chart)    Relevant Information (PMH & Previous Tests/Imaging):     H/O low BP   Previous Interventions/Treatments: Previous OT intervention with this therapist at another location.  Client had been using custom fabricated dorsal based radial palsy orthosis  Reporting not using this orthosis for approximately one month due to progress of wrist and finger extension.     Precautions: none      Subjective:     DOI/DOS: R shoulder replacement July 3, 2024   Revision October 3, 2024     Current Condition / ADL status  since injury:   difficulty showering eating    dressing    Primary Language: English    There are no spiritual/cultural practices/values/needs that are important to know      Patients Living Environment: Reviewed and no concern    Equipment    Prior Level of Function (PLOF)  Independent with daily activities     Pt goals for therapy:  be able to do with my right hand what I used to confident using have the strength back   Put on make up     Red Flags: Do you have any of the following? No  Fever/chills, unexplained weight changes, dizziness/fainting, unexplained change in bowel or bladder functions, unexplained malaise or muscle weakness, night pain/sweats, numbness or tingling    Safety/ fall risk no h/o falling   Knows to be careful due to h/o low BP     SENSORY AWARENESS/ PROCESS  Numbness palm and fingers     TEN TEST  10/5/8/8/10    PAIN  Right shoulder 7/10  Right hand can be achy    Hand dominance: Right    Objective:    RIGHT HAND AROM (Degrees)  Composite flexion WNL  MP J ext approx 50 %  Thumb ext approx 50%    WRIST AROM (Degrees)   R L   Extension 35 55   Flexion 60 70   Radial Deviation 15 20   Ulnar Deviation 10 35         WRIST STRENGTH (MMT)  Extension 2+/5     Hand Strength Measures (lbs)   R L   Dynamometer  2 30   Lateral Pinch 2 8       Coordination  Nine Hole Peg Test  TBA    Physical Observation:  Edema: proximal swelling upper arm to shoulder     Occupational Performance    Patient Specific Functional Scale   Use a spoon with soup 5  Button 3-4  Make up 0  Brush teeth 0  Total 8.5/ 5 = 2.1    Outcome Measures:  QuickDASH: 31    EDUCATION: home exercise program, plan of care, activity modifications, pain management, and injury pathology     Exercises  - Seated Finger MP Extension AROM with Blocking  - 2 x daily - 7 x weekly - 2 sets - 10 reps  - Seated Thumb Extension  - 2 x daily - 7 x weekly - 2 sets - 10 reps  - Seated Finger MP Flexion AROM and Wrist Extension  - 2 x daily - 7 x weekly - 2 sets - 10 reps performed with light object  to hand   - Thumb AROM Opposition  - 2 x daily - 7 x weekly - 2 sets - 10 reps      Goals:    Client will increase independent performance of daily activities aeb >/= 7 point increase in the Patient Specific Functional Scale score.    Client will increase right  and pinch strength to age group norms for improved ability to perform daily activities.     Client will demonstrate right fine motor coordination per  Nine Hole Peg Test score to age  group norm form improved ability to manipulate objects.     Client will be instructed on joint protection techniques, activity modification and use of adaptive equipment as applied to ADL / IADL tasks, verbalize good understanding.    Plan of care was developed with input and agreement by the patient.      Assessment: Patient is a 75 yo woman   s/p right shoulder replacement revision surger resulting in limited participation in pain-free ADLs and inability to perform at her prior level of function. Pt would benefit from occupational therapy to address the impairments found  listed previously in the objective section in order to return to safe and pain-free ADLs and prior level of function.       Clinical Presentation: Stable and/or uncomplicated characteristics     Personal Factors: None     Plan:      Planned Interventions include: therapeutic exercise, therapeutic activity, self-care home management, manual therapy, therapeutic activities, neuromuscular coordination,  electric stimulation, fluidotherapy, kinesiotaping, orthosis fabrication  Frequency: 1-2 x Week  Duration: 16 visits  Rehab potential/prognosis: Eron Ryder OT

## 2025-06-10 ENCOUNTER — APPOINTMENT (OUTPATIENT)
Dept: RADIOLOGY | Facility: CLINIC | Age: 75
End: 2025-06-10
Payer: COMMERCIAL

## 2025-06-10 VITALS — BODY MASS INDEX: 24.16 KG/M2 | HEIGHT: 65 IN | WEIGHT: 145 LBS

## 2025-06-10 DIAGNOSIS — Z12.31 ENCOUNTER FOR SCREENING MAMMOGRAM FOR BREAST CANCER: ICD-10-CM

## 2025-06-10 PROCEDURE — 77067 SCR MAMMO BI INCL CAD: CPT | Performed by: RADIOLOGY

## 2025-06-10 PROCEDURE — 77063 BREAST TOMOSYNTHESIS BI: CPT | Performed by: RADIOLOGY

## 2025-06-10 PROCEDURE — 77067 SCR MAMMO BI INCL CAD: CPT

## 2025-06-11 ENCOUNTER — APPOINTMENT (OUTPATIENT)
Dept: PHYSICAL THERAPY | Facility: CLINIC | Age: 75
End: 2025-06-11
Payer: COMMERCIAL

## 2025-07-07 ENCOUNTER — TELEPHONE (OUTPATIENT)
Dept: INFECTIOUS DISEASES | Facility: HOSPITAL | Age: 75
End: 2025-07-07
Payer: COMMERCIAL

## 2025-07-07 DIAGNOSIS — M86.38 CHRONIC MULTIFOCAL OSTEOMYELITIS, OTHER SITE: ICD-10-CM

## 2025-07-07 RX ORDER — DOXYCYCLINE 100 MG/1
100 CAPSULE ORAL EVERY 12 HOURS
Qty: 180 CAPSULE | Refills: 1 | Status: SHIPPED | OUTPATIENT
Start: 2025-07-07 | End: 2025-10-05

## 2025-07-10 ENCOUNTER — APPOINTMENT (OUTPATIENT)
Dept: PRIMARY CARE | Facility: CLINIC | Age: 75
End: 2025-07-10
Payer: COMMERCIAL

## 2025-07-25 ENCOUNTER — TREATMENT (OUTPATIENT)
Dept: OCCUPATIONAL THERAPY | Facility: CLINIC | Age: 75
End: 2025-07-25
Payer: COMMERCIAL

## 2025-07-25 DIAGNOSIS — Z96.619: Primary | ICD-10-CM

## 2025-07-25 PROCEDURE — 97110 THERAPEUTIC EXERCISES: CPT | Mod: GO | Performed by: OCCUPATIONAL THERAPIST

## 2025-07-25 NOTE — PROGRESS NOTES
Occupational Therapy  Occupational Therapy Treatment    Patient Name: Emy Potts  MRN: [id number]  Encounter Date: 7/25/2025  Time Calculation  Start Time: 0230  Stop Time: 0315  Time Calculation (min): 45 min    Insurance:  Visit count could not be calculated. Make sure you are using a visit which is associated with an episode.  Visit number: 2 of 16  Authorization info: 2025 BENEFIT / NO AUTH / 10% COINS / MED NEC / $250 DED / $2250 OOP / ANTHEM / [employer]AILITY # [id number]   Insurance Type: Payor: ANTHEM / Plan: ANTHEM HMP / Product Type: *No Product type* /     Today's Charges:     OT Therapeutic Procedures Time Entry  Therapeutic Exercise Time Entry: 45             Current Problem  Problem List Items Addressed This Visit           ICD-10-CM    S/P shoulder hemiarthroplasty, unspecified laterality - Primary Z96.619     General:  Reason for visit: Client referred to occupational therapy for evaluation and treatment due to 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 Initial surgery was July 3, 2024 and with revision Oct 3, 2024. Reported radial weakness apparent after discontinuation of sling.     Referred by: Cass Hanks, APRN-CNP     Precautions     H/o low BP   Has appt with Dr Gonsalez  August 11     Subjective:   Patient reports she can lift some things   Yoni to use her Toothbrush with her right hand   Also fork and spoon  No numbness tingling     Pain   Wrist pain with exercise repetions     Objective:     - Seated Finger MP Extension AROM with Blocking  -2 sets - 10 reps  - Seated Thumb Extension  -  2 sets - 10 reps  - Seated Finger MP Flexion AROM and Wrist Extension  - - 2 sets - 10 reps performed with light object to hand   - Thumb AROM Opposition  - - 2 sets - 10 reps  Forearm rotation with light object to hand  2 x 10     Previously issued soft theraputty: iso ext /press/ composite flexion / bimanual roll/ press with dowel/ digit adduction with palm on table/ individual finger  extension   Performing for home program  Clarified reviewed exercises  Updated handout      Assessment:  Patient is a 73 yo woman   s/p right shoulder replacement revision surger resulting in limited participation in pain-free ADLs and inability to perform at her prior level of function. Pt would benefit from occupational therapy to address the impairments found  listed previously in the objective section in order to return to safe and pain-free ADLs and prior level of function.        Plan: Continue the plan of care established for this patient in the initial evaluation.    Check tolerance and performance of home instructions.          Valarie Ryder, OT

## 2025-08-02 DIAGNOSIS — E78.00 HYPERCHOLESTEROLEMIA: ICD-10-CM

## 2025-08-04 RX ORDER — ATORVASTATIN CALCIUM 20 MG/1
20 TABLET, FILM COATED ORAL DAILY
Qty: 90 TABLET | Refills: 1 | Status: SHIPPED | OUTPATIENT
Start: 2025-08-04

## 2025-08-23 DIAGNOSIS — I10 BENIGN ESSENTIAL HYPERTENSION: ICD-10-CM

## 2025-08-25 ENCOUNTER — APPOINTMENT (OUTPATIENT)
Dept: PRIMARY CARE | Facility: CLINIC | Age: 75
End: 2025-08-25
Payer: COMMERCIAL

## 2025-08-25 RX ORDER — OLMESARTAN MEDOXOMIL 20 MG/1
20 TABLET ORAL DAILY
Qty: 90 TABLET | Refills: 1 | Status: SHIPPED | OUTPATIENT
Start: 2025-08-25

## 2026-03-10 ENCOUNTER — APPOINTMENT (OUTPATIENT)
Dept: PRIMARY CARE | Facility: CLINIC | Age: 76
End: 2026-03-10
Payer: COMMERCIAL

## (undated) DEVICE — DRAPE, SHEET, 17 X 23 IN

## (undated) DEVICE — Device

## (undated) DEVICE — BB-TAK, THREADED

## (undated) DEVICE — IMPLANTABLE DEVICE: Type: IMPLANTABLE DEVICE | Site: ANKLE | Status: NON-FUNCTIONAL

## (undated) DEVICE — COVER, C-ARM W/CLIPS, OEC GE

## (undated) DEVICE — DRILL BIT, 2.0MM CALIBRATED

## (undated) DEVICE — GLOVE, SURGICAL, SYNTHETIC, DERMAPRENE, 7, PF, LF, GREEN

## (undated) DEVICE — DRILL BIT, 2.7 MM

## (undated) DEVICE — CUFF, TOURNIQUET, 30 X 4, DUAL PORT/SNGL BLADDER, DISP, LF

## (undated) DEVICE — DRAPE COVER, C ARM, FLOUROSCAN IMAGING SYS

## (undated) DEVICE — TUBING, PATIENT 8FT STERILE

## (undated) DEVICE — DRESSING, GAUZE, PETROLATUM, PATCH, XEROFORM, 1 X 8 IN, STERILE

## (undated) DEVICE — GLOVE, SURGICAL, PROTEXIS PI MICRO, 7.0, PF, LF

## (undated) DEVICE — KIT, MINOR, DOUBLE BASIN

## (undated) DEVICE — DRESSING, ABDOMINAL, TENDERSORB, 8 X 10 IN, STERILE

## (undated) DEVICE — SYRINGE, 20 CC, LUER LOCK, MONOJECT, W/O CAP, LF

## (undated) DEVICE — GLOVE, SURGICAL, PROTEXIS PI ORTHO, 7.0, PF, LF

## (undated) DEVICE — DRAPE, SHEET, THREE QUARTER, FAN FOLD, 57 X 77 IN

## (undated) DEVICE — SUTURE, VICRYL PLUS, 4-0, 27 IN, PS-2

## (undated) DEVICE — DRILL BIT, CANNULATED, 2.6MM

## (undated) DEVICE — SUTURE, ETHILON, 4-0, BLK, MONO, PS-2 18

## (undated) DEVICE — SYRINGE, LUER LOCK, 12ML

## (undated) DEVICE — CORD, CAUTERY, BIOPOLAR FORCEP, 12FT

## (undated) DEVICE — DRILL BIT, 2.5 MM

## (undated) DEVICE — BANDAGE, ESMARK 4 IN X 9 FT, STERILE

## (undated) DEVICE — TUBING, PUMP REDEUCE 8FT STERILE

## (undated) DEVICE — BLADE, DISSECTOR, 3.0M X 7CM

## (undated) DEVICE — DRILL BIT, 2.5MM CALIBRATED

## (undated) DEVICE — SUTURE, VICRYL, 0, 27 IN, CT-2, UNDYED

## (undated) DEVICE — TUBING, SUCTION, NON-CONDUCTIVE, W/CONNECT,.25 IN X 12 FT, STERILE, LF